# Patient Record
Sex: MALE | Race: WHITE | NOT HISPANIC OR LATINO | URBAN - METROPOLITAN AREA
[De-identification: names, ages, dates, MRNs, and addresses within clinical notes are randomized per-mention and may not be internally consistent; named-entity substitution may affect disease eponyms.]

---

## 2019-04-30 ENCOUNTER — OUTPATIENT (OUTPATIENT)
Dept: OUTPATIENT SERVICES | Facility: HOSPITAL | Age: 64
LOS: 1 days | End: 2019-04-30
Payer: COMMERCIAL

## 2019-04-30 VITALS
TEMPERATURE: 98 F | OXYGEN SATURATION: 98 % | RESPIRATION RATE: 16 BRPM | DIASTOLIC BLOOD PRESSURE: 86 MMHG | SYSTOLIC BLOOD PRESSURE: 140 MMHG | HEART RATE: 51 BPM | WEIGHT: 214.07 LBS | HEIGHT: 70 IN

## 2019-04-30 DIAGNOSIS — K43.6 OTHER AND UNSPECIFIED VENTRAL HERNIA WITH OBSTRUCTION, WITHOUT GANGRENE: ICD-10-CM

## 2019-04-30 DIAGNOSIS — Z98.890 OTHER SPECIFIED POSTPROCEDURAL STATES: ICD-10-CM

## 2019-04-30 DIAGNOSIS — K43.9 VENTRAL HERNIA WITHOUT OBSTRUCTION OR GANGRENE: ICD-10-CM

## 2019-04-30 DIAGNOSIS — Z98.890 OTHER SPECIFIED POSTPROCEDURAL STATES: Chronic | ICD-10-CM

## 2019-04-30 LAB
ANION GAP SERPL CALC-SCNC: 14 MMO/L — SIGNIFICANT CHANGE UP (ref 7–14)
BUN SERPL-MCNC: 23 MG/DL — SIGNIFICANT CHANGE UP (ref 7–23)
CALCIUM SERPL-MCNC: 10.1 MG/DL — SIGNIFICANT CHANGE UP (ref 8.4–10.5)
CHLORIDE SERPL-SCNC: 100 MMOL/L — SIGNIFICANT CHANGE UP (ref 98–107)
CO2 SERPL-SCNC: 28 MMOL/L — SIGNIFICANT CHANGE UP (ref 22–31)
CREAT SERPL-MCNC: 1.26 MG/DL — SIGNIFICANT CHANGE UP (ref 0.5–1.3)
GLUCOSE SERPL-MCNC: 114 MG/DL — HIGH (ref 70–99)
HCT VFR BLD CALC: 42.9 % — SIGNIFICANT CHANGE UP (ref 39–50)
HGB BLD-MCNC: 14.7 G/DL — SIGNIFICANT CHANGE UP (ref 13–17)
MCHC RBC-ENTMCNC: 29.2 PG — SIGNIFICANT CHANGE UP (ref 27–34)
MCHC RBC-ENTMCNC: 34.3 % — SIGNIFICANT CHANGE UP (ref 32–36)
MCV RBC AUTO: 85.1 FL — SIGNIFICANT CHANGE UP (ref 80–100)
NRBC # FLD: 0 K/UL — SIGNIFICANT CHANGE UP (ref 0–0)
PLATELET # BLD AUTO: 267 K/UL — SIGNIFICANT CHANGE UP (ref 150–400)
PMV BLD: 9.8 FL — SIGNIFICANT CHANGE UP (ref 7–13)
POTASSIUM SERPL-MCNC: 4.5 MMOL/L — SIGNIFICANT CHANGE UP (ref 3.5–5.3)
POTASSIUM SERPL-SCNC: 4.5 MMOL/L — SIGNIFICANT CHANGE UP (ref 3.5–5.3)
RBC # BLD: 5.04 M/UL — SIGNIFICANT CHANGE UP (ref 4.2–5.8)
RBC # FLD: 13.3 % — SIGNIFICANT CHANGE UP (ref 10.3–14.5)
SODIUM SERPL-SCNC: 142 MMOL/L — SIGNIFICANT CHANGE UP (ref 135–145)
WBC # BLD: 7.74 K/UL — SIGNIFICANT CHANGE UP (ref 3.8–10.5)
WBC # FLD AUTO: 7.74 K/UL — SIGNIFICANT CHANGE UP (ref 3.8–10.5)

## 2019-04-30 PROCEDURE — 93010 ELECTROCARDIOGRAM REPORT: CPT

## 2019-04-30 NOTE — H&P PST ADULT - HISTORY OF PRESENT ILLNESS
64 y/o male with hx of ventral hernia x2 years, which has  been enlarging over time.  Pt denies abdominal pain.  Scheduled for Ventral hernia repair 5/13/19.

## 2019-04-30 NOTE — H&P PST ADULT - NSICDXPROBLEM_GEN_ALL_CORE_FT
PROBLEM DIAGNOSES  Problem: Ventral hernia  Assessment and Plan: Ventral hernia reapir 5/13/19  CBC BMP EKG  pre-op instructions given and explained  DAI precautions, OR booking informed  Pt reports he has appt to see PMD for pre-op eval  5/8/19 as requested by surgeon, requested on chart

## 2019-04-30 NOTE — H&P PST ADULT - ASSESSMENT
62 y/o male with hx of ventral hernia x2 years, which has  been enlarging over time.  Pt denies abdominal pain.  Scheduled for Ventral hernia repair 5/13/19.

## 2019-04-30 NOTE — H&P PST ADULT - NSANTHOSAYNRD_GEN_A_CORE
No. DAI screening performed.  STOP BANG Legend: 0-2 = LOW Risk; 3-4 = INTERMEDIATE Risk; 5-8 = HIGH Risk

## 2019-04-30 NOTE — H&P PST ADULT - GASTROINTESTINAL COMMENTS
hx of ventral hernia x2 years, which has  been enlarging over time.  Pt denies abdominal pain.  Scheduled for Ventral hernia repair 5/13/19.

## 2019-05-13 ENCOUNTER — INPATIENT (INPATIENT)
Facility: HOSPITAL | Age: 64
LOS: 9 days | Discharge: HOME | End: 2019-05-23
Attending: SURGERY | Admitting: SURGERY
Payer: COMMERCIAL

## 2019-05-13 VITALS
DIASTOLIC BLOOD PRESSURE: 75 MMHG | TEMPERATURE: 98 F | SYSTOLIC BLOOD PRESSURE: 149 MMHG | HEART RATE: 53 BPM | RESPIRATION RATE: 16 BRPM | HEIGHT: 70 IN | OXYGEN SATURATION: 96 % | WEIGHT: 214.07 LBS

## 2019-05-13 DIAGNOSIS — Z98.890 OTHER SPECIFIED POSTPROCEDURAL STATES: Chronic | ICD-10-CM

## 2019-05-13 DIAGNOSIS — K43.6 OTHER AND UNSPECIFIED VENTRAL HERNIA WITH OBSTRUCTION, WITHOUT GANGRENE: ICD-10-CM

## 2019-05-13 LAB
ANION GAP SERPL CALC-SCNC: 12 MMO/L — SIGNIFICANT CHANGE UP (ref 7–14)
APTT BLD: 33.8 SEC — SIGNIFICANT CHANGE UP (ref 27.5–36.3)
BASOPHILS # BLD AUTO: 0.04 K/UL — SIGNIFICANT CHANGE UP (ref 0–0.2)
BASOPHILS NFR BLD AUTO: 0.3 % — SIGNIFICANT CHANGE UP (ref 0–2)
BUN SERPL-MCNC: 20 MG/DL — SIGNIFICANT CHANGE UP (ref 7–23)
CALCIUM SERPL-MCNC: 8.8 MG/DL — SIGNIFICANT CHANGE UP (ref 8.4–10.5)
CHLORIDE SERPL-SCNC: 104 MMOL/L — SIGNIFICANT CHANGE UP (ref 98–107)
CO2 SERPL-SCNC: 25 MMOL/L — SIGNIFICANT CHANGE UP (ref 22–31)
CREAT SERPL-MCNC: 1.13 MG/DL — SIGNIFICANT CHANGE UP (ref 0.5–1.3)
EOSINOPHIL # BLD AUTO: 0.03 K/UL — SIGNIFICANT CHANGE UP (ref 0–0.5)
EOSINOPHIL NFR BLD AUTO: 0.3 % — SIGNIFICANT CHANGE UP (ref 0–6)
GLUCOSE SERPL-MCNC: 157 MG/DL — HIGH (ref 70–99)
HCT VFR BLD CALC: 40 % — SIGNIFICANT CHANGE UP (ref 39–50)
HGB BLD-MCNC: 13.7 G/DL — SIGNIFICANT CHANGE UP (ref 13–17)
IMM GRANULOCYTES NFR BLD AUTO: 0.3 % — SIGNIFICANT CHANGE UP (ref 0–1.5)
INR BLD: 1.1 — SIGNIFICANT CHANGE UP (ref 0.88–1.17)
LYMPHOCYTES # BLD AUTO: 0.53 K/UL — LOW (ref 1–3.3)
LYMPHOCYTES # BLD AUTO: 4.6 % — LOW (ref 13–44)
MCHC RBC-ENTMCNC: 29.2 PG — SIGNIFICANT CHANGE UP (ref 27–34)
MCHC RBC-ENTMCNC: 34.3 % — SIGNIFICANT CHANGE UP (ref 32–36)
MCV RBC AUTO: 85.3 FL — SIGNIFICANT CHANGE UP (ref 80–100)
MONOCYTES # BLD AUTO: 0.39 K/UL — SIGNIFICANT CHANGE UP (ref 0–0.9)
MONOCYTES NFR BLD AUTO: 3.4 % — SIGNIFICANT CHANGE UP (ref 2–14)
NEUTROPHILS # BLD AUTO: 10.52 K/UL — HIGH (ref 1.8–7.4)
NEUTROPHILS NFR BLD AUTO: 91.1 % — HIGH (ref 43–77)
NRBC # FLD: 0 K/UL — SIGNIFICANT CHANGE UP (ref 0–0)
PLATELET # BLD AUTO: 258 K/UL — SIGNIFICANT CHANGE UP (ref 150–400)
PMV BLD: 9.4 FL — SIGNIFICANT CHANGE UP (ref 7–13)
POTASSIUM SERPL-MCNC: 3.9 MMOL/L — SIGNIFICANT CHANGE UP (ref 3.5–5.3)
POTASSIUM SERPL-SCNC: 3.9 MMOL/L — SIGNIFICANT CHANGE UP (ref 3.5–5.3)
PROTHROM AB SERPL-ACNC: 12.2 SEC — SIGNIFICANT CHANGE UP (ref 9.8–13.1)
RBC # BLD: 4.69 M/UL — SIGNIFICANT CHANGE UP (ref 4.2–5.8)
RBC # FLD: 13.4 % — SIGNIFICANT CHANGE UP (ref 10.3–14.5)
SODIUM SERPL-SCNC: 141 MMOL/L — SIGNIFICANT CHANGE UP (ref 135–145)
WBC # BLD: 11.54 K/UL — HIGH (ref 3.8–10.5)
WBC # FLD AUTO: 11.54 K/UL — HIGH (ref 3.8–10.5)

## 2019-05-13 PROCEDURE — 88302 TISSUE EXAM BY PATHOLOGIST: CPT | Mod: 26

## 2019-05-13 PROCEDURE — 88305 TISSUE EXAM BY PATHOLOGIST: CPT | Mod: 26

## 2019-05-13 PROCEDURE — 88307 TISSUE EXAM BY PATHOLOGIST: CPT | Mod: 26

## 2019-05-13 PROCEDURE — 88304 TISSUE EXAM BY PATHOLOGIST: CPT | Mod: 26

## 2019-05-13 RX ORDER — OLMESARTAN MEDOXOMIL 5 MG/1
1 TABLET, FILM COATED ORAL
Qty: 0 | Refills: 0 | DISCHARGE

## 2019-05-13 RX ORDER — OXYCODONE HYDROCHLORIDE 5 MG/1
5 TABLET ORAL ONCE
Refills: 0 | Status: DISCONTINUED | OUTPATIENT
Start: 2019-05-13 | End: 2019-05-13

## 2019-05-13 RX ORDER — ACETAMINOPHEN 500 MG
1000 TABLET ORAL ONCE
Refills: 0 | Status: COMPLETED | OUTPATIENT
Start: 2019-05-13 | End: 2019-05-13

## 2019-05-13 RX ORDER — ACETAMINOPHEN 500 MG
1000 TABLET ORAL ONCE
Refills: 0 | Status: COMPLETED | OUTPATIENT
Start: 2019-05-13 | End: 2019-05-14

## 2019-05-13 RX ORDER — METOPROLOL TARTRATE 50 MG
1 TABLET ORAL
Qty: 0 | Refills: 0 | DISCHARGE

## 2019-05-13 RX ORDER — CEFOTETAN DISODIUM 1 G
2 VIAL (EA) INJECTION EVERY 12 HOURS
Refills: 0 | Status: DISCONTINUED | OUTPATIENT
Start: 2019-05-13 | End: 2019-05-14

## 2019-05-13 RX ORDER — FENTANYL CITRATE 50 UG/ML
25 INJECTION INTRAVENOUS
Refills: 0 | Status: DISCONTINUED | OUTPATIENT
Start: 2019-05-13 | End: 2019-05-13

## 2019-05-13 RX ORDER — NIFEDIPINE 30 MG
1 TABLET, EXTENDED RELEASE 24 HR ORAL
Qty: 0 | Refills: 0 | DISCHARGE

## 2019-05-13 RX ORDER — HYDROMORPHONE HYDROCHLORIDE 2 MG/ML
30 INJECTION INTRAMUSCULAR; INTRAVENOUS; SUBCUTANEOUS
Refills: 0 | Status: DISCONTINUED | OUTPATIENT
Start: 2019-05-13 | End: 2019-05-17

## 2019-05-13 RX ORDER — ONDANSETRON 8 MG/1
4 TABLET, FILM COATED ORAL ONCE
Refills: 0 | Status: DISCONTINUED | OUTPATIENT
Start: 2019-05-13 | End: 2019-05-17

## 2019-05-13 RX ORDER — SODIUM CHLORIDE 9 MG/ML
1000 INJECTION, SOLUTION INTRAVENOUS
Refills: 0 | Status: DISCONTINUED | OUTPATIENT
Start: 2019-05-13 | End: 2019-05-14

## 2019-05-13 RX ORDER — VITAMIN E 100 UNIT
1 CAPSULE ORAL
Qty: 0 | Refills: 0 | DISCHARGE

## 2019-05-13 RX ORDER — SODIUM CHLORIDE 9 MG/ML
1000 INJECTION, SOLUTION INTRAVENOUS
Refills: 0 | Status: DISCONTINUED | OUTPATIENT
Start: 2019-05-13 | End: 2019-05-13

## 2019-05-13 RX ORDER — ENOXAPARIN SODIUM 100 MG/ML
40 INJECTION SUBCUTANEOUS DAILY
Refills: 0 | Status: DISCONTINUED | OUTPATIENT
Start: 2019-05-13 | End: 2019-05-14

## 2019-05-13 RX ORDER — HYDROMORPHONE HYDROCHLORIDE 2 MG/ML
0.5 INJECTION INTRAMUSCULAR; INTRAVENOUS; SUBCUTANEOUS
Refills: 0 | Status: DISCONTINUED | OUTPATIENT
Start: 2019-05-13 | End: 2019-05-17

## 2019-05-13 RX ORDER — FOLIC ACID 0.8 MG
1 TABLET ORAL
Qty: 0 | Refills: 0 | DISCHARGE

## 2019-05-13 RX ORDER — ROSUVASTATIN CALCIUM 5 MG/1
1 TABLET ORAL
Qty: 0 | Refills: 0 | DISCHARGE

## 2019-05-13 RX ORDER — ONDANSETRON 8 MG/1
4 TABLET, FILM COATED ORAL EVERY 6 HOURS
Refills: 0 | Status: DISCONTINUED | OUTPATIENT
Start: 2019-05-13 | End: 2019-05-17

## 2019-05-13 RX ORDER — ICOSAPENT ETHYL 500 MG/1
1 CAPSULE, LIQUID FILLED ORAL
Qty: 0 | Refills: 0 | DISCHARGE

## 2019-05-13 RX ORDER — SPIRONOLACTONE 25 MG/1
1 TABLET, FILM COATED ORAL
Qty: 0 | Refills: 0 | DISCHARGE

## 2019-05-13 RX ORDER — METOPROLOL TARTRATE 50 MG
5 TABLET ORAL EVERY 6 HOURS
Refills: 0 | Status: DISCONTINUED | OUTPATIENT
Start: 2019-05-13 | End: 2019-05-14

## 2019-05-13 RX ORDER — NALOXONE HYDROCHLORIDE 4 MG/.1ML
0.1 SPRAY NASAL
Refills: 0 | Status: DISCONTINUED | OUTPATIENT
Start: 2019-05-13 | End: 2019-05-17

## 2019-05-13 RX ORDER — ACETAMINOPHEN 500 MG
1000 TABLET ORAL ONCE
Refills: 0 | Status: COMPLETED | OUTPATIENT
Start: 2019-05-14 | End: 2019-05-14

## 2019-05-13 RX ORDER — HYDROMORPHONE HYDROCHLORIDE 2 MG/ML
0.5 INJECTION INTRAMUSCULAR; INTRAVENOUS; SUBCUTANEOUS
Refills: 0 | Status: DISCONTINUED | OUTPATIENT
Start: 2019-05-13 | End: 2019-05-13

## 2019-05-13 RX ADMIN — ENOXAPARIN SODIUM 40 MILLIGRAM(S): 100 INJECTION SUBCUTANEOUS at 13:05

## 2019-05-13 RX ADMIN — SODIUM CHLORIDE 125 MILLILITER(S): 9 INJECTION, SOLUTION INTRAVENOUS at 15:11

## 2019-05-13 RX ADMIN — HYDROMORPHONE HYDROCHLORIDE 30 MILLILITER(S): 2 INJECTION INTRAMUSCULAR; INTRAVENOUS; SUBCUTANEOUS at 15:10

## 2019-05-13 RX ADMIN — HYDROMORPHONE HYDROCHLORIDE 30 MILLILITER(S): 2 INJECTION INTRAMUSCULAR; INTRAVENOUS; SUBCUTANEOUS at 11:45

## 2019-05-13 RX ADMIN — Medication 100 GRAM(S): at 19:00

## 2019-05-13 RX ADMIN — Medication 400 MILLIGRAM(S): at 18:06

## 2019-05-13 RX ADMIN — HYDROMORPHONE HYDROCHLORIDE 30 MILLILITER(S): 2 INJECTION INTRAMUSCULAR; INTRAVENOUS; SUBCUTANEOUS at 20:38

## 2019-05-13 RX ADMIN — Medication 1000 MILLIGRAM(S): at 18:45

## 2019-05-13 RX ADMIN — Medication 5 MILLIGRAM(S): at 13:05

## 2019-05-13 NOTE — ASU PREOP CHECKLIST - 2.
patient is going for remove abdoman hematoma tosday. NPO since midnight. Abdoman dressing in place with abdoman binder in place, paulino 16 fr to bedside drainage.

## 2019-05-13 NOTE — BRIEF OPERATIVE NOTE - NSICDXBRIEFPOSTOP_GEN_ALL_CORE_FT
POST-OP DIAGNOSIS:  Incisional hernia 13-May-2019 11:08:11  Maame Liu  Enterocutaneous fistula 13-May-2019 11:07:59  Maame Liu

## 2019-05-13 NOTE — PROGRESS NOTE ADULT - SUBJECTIVE AND OBJECTIVE BOX
A Team Surgery Post Op Note     STATUS POST:  exlap, takedown of EC fistula, small bowel resection, incisional hernia repair with phasix mesh and removal of old mesh.     SUBJECTIVE: Pt seen s/p the above procedure. Patient resting comfortably in bed. Denies chest pain, palpitations, SOB, nausea. Complains only of mild incisional pain, but not severe enough he needs medications. No flatus or BM.     Vital Signs Last 24 Hrs  T(C): 37.1 (13 May 2019 14:36), Max: 37.1 (13 May 2019 14:36)  T(F): 98.7 (13 May 2019 14:36), Max: 98.7 (13 May 2019 14:36)  HR: 81 (13 May 2019 14:36) (53 - 81)  BP: 148/90 (13 May 2019 14:36) (130/81 - 149/75)  BP(mean): 102 (13 May 2019 13:00) (87 - 102)  RR: 18 (13 May 2019 14:36) (10 - 18)  SpO2: 96% (13 May 2019 14:36) (96% - 100%)  Blanco:  NGT:  I&O's Summary    13 May 2019 07:01  -  13 May 2019 17:13  --------------------------------------------------------  IN: 0 mL / OUT: 640 mL / NET: -640 mL      I&O's Detail    13 May 2019 07:01  -  13 May 2019 17:13  --------------------------------------------------------  IN:  Total IN: 0 mL    OUT:    Bulb: 30 mL    Indwelling Catheter - Urethral: 610 mL  Total OUT: 640 mL    Total NET: -640 mL          PHYSICAL EXAM:  Constitutional: NAD, pleasant, alert, oriented.   Respiratory:  Lungs CTA, B/L, no rales , no wheezing, no rhonchi.  Cardiovascular:  S1, S2, RRR  Gastrointestinal: Abdomen soft, non distended  - Midline dressing: c/d/i; DEREK drain: seosanguinou-- sanguinous > serous  Genitourinary:  Blanco in place draining clear urine.   Extremities:  No edema, no calf tenderrness,      LABS:                        13.7   11.54 )-----------( 258      ( 13 May 2019 13:00 )             40.0     05-13    141  |  104  |  20  ----------------------------<  157<H>  3.9   |  25  |  1.13    Ca    8.8      13 May 2019 13:01      PT/INR - ( 13 May 2019 12:08 )   PT: 12.2 SEC;   INR: 1.10          PTT - ( 13 May 2019 12:08 )  PTT:33.8 SEC

## 2019-05-14 LAB
ANION GAP SERPL CALC-SCNC: 12 MMO/L — SIGNIFICANT CHANGE UP (ref 7–14)
ANION GAP SERPL CALC-SCNC: 12 MMO/L — SIGNIFICANT CHANGE UP (ref 7–14)
APTT BLD: 28.9 SEC — SIGNIFICANT CHANGE UP (ref 27.5–36.3)
BLD GP AB SCN SERPL QL: NEGATIVE — SIGNIFICANT CHANGE UP
BUN SERPL-MCNC: 21 MG/DL — SIGNIFICANT CHANGE UP (ref 7–23)
BUN SERPL-MCNC: 26 MG/DL — HIGH (ref 7–23)
CALCIUM SERPL-MCNC: 8.2 MG/DL — LOW (ref 8.4–10.5)
CALCIUM SERPL-MCNC: 8.7 MG/DL — SIGNIFICANT CHANGE UP (ref 8.4–10.5)
CHLORIDE SERPL-SCNC: 100 MMOL/L — SIGNIFICANT CHANGE UP (ref 98–107)
CHLORIDE SERPL-SCNC: 101 MMOL/L — SIGNIFICANT CHANGE UP (ref 98–107)
CO2 SERPL-SCNC: 25 MMOL/L — SIGNIFICANT CHANGE UP (ref 22–31)
CO2 SERPL-SCNC: 26 MMOL/L — SIGNIFICANT CHANGE UP (ref 22–31)
CREAT SERPL-MCNC: 1.21 MG/DL — SIGNIFICANT CHANGE UP (ref 0.5–1.3)
CREAT SERPL-MCNC: 1.43 MG/DL — HIGH (ref 0.5–1.3)
GLUCOSE SERPL-MCNC: 152 MG/DL — HIGH (ref 70–99)
GLUCOSE SERPL-MCNC: 194 MG/DL — HIGH (ref 70–99)
HCT VFR BLD CALC: 25.7 % — LOW (ref 39–50)
HCT VFR BLD CALC: 31.7 % — LOW (ref 39–50)
HCT VFR BLD CALC: 34.9 % — LOW (ref 39–50)
HGB BLD-MCNC: 10.9 G/DL — LOW (ref 13–17)
HGB BLD-MCNC: 11.9 G/DL — LOW (ref 13–17)
HGB BLD-MCNC: 8.7 G/DL — LOW (ref 13–17)
INR BLD: 1.2 — HIGH (ref 0.88–1.17)
MAGNESIUM SERPL-MCNC: 1.7 MG/DL — SIGNIFICANT CHANGE UP (ref 1.6–2.6)
MAGNESIUM SERPL-MCNC: 2 MG/DL — SIGNIFICANT CHANGE UP (ref 1.6–2.6)
MCHC RBC-ENTMCNC: 28.8 PG — SIGNIFICANT CHANGE UP (ref 27–34)
MCHC RBC-ENTMCNC: 29.6 PG — SIGNIFICANT CHANGE UP (ref 27–34)
MCHC RBC-ENTMCNC: 29.6 PG — SIGNIFICANT CHANGE UP (ref 27–34)
MCHC RBC-ENTMCNC: 33.9 % — SIGNIFICANT CHANGE UP (ref 32–36)
MCHC RBC-ENTMCNC: 34.1 % — SIGNIFICANT CHANGE UP (ref 32–36)
MCHC RBC-ENTMCNC: 34.4 % — SIGNIFICANT CHANGE UP (ref 32–36)
MCV RBC AUTO: 84.5 FL — SIGNIFICANT CHANGE UP (ref 80–100)
MCV RBC AUTO: 86.1 FL — SIGNIFICANT CHANGE UP (ref 80–100)
MCV RBC AUTO: 87.4 FL — SIGNIFICANT CHANGE UP (ref 80–100)
NRBC # FLD: 0 K/UL — SIGNIFICANT CHANGE UP (ref 0–0)
PHOSPHATE SERPL-MCNC: 3.5 MG/DL — SIGNIFICANT CHANGE UP (ref 2.5–4.5)
PHOSPHATE SERPL-MCNC: 3.6 MG/DL — SIGNIFICANT CHANGE UP (ref 2.5–4.5)
PLATELET # BLD AUTO: 238 K/UL — SIGNIFICANT CHANGE UP (ref 150–400)
PLATELET # BLD AUTO: 257 K/UL — SIGNIFICANT CHANGE UP (ref 150–400)
PLATELET # BLD AUTO: 296 K/UL — SIGNIFICANT CHANGE UP (ref 150–400)
PMV BLD: 9.4 FL — SIGNIFICANT CHANGE UP (ref 7–13)
PMV BLD: 9.6 FL — SIGNIFICANT CHANGE UP (ref 7–13)
PMV BLD: 9.7 FL — SIGNIFICANT CHANGE UP (ref 7–13)
POTASSIUM SERPL-MCNC: 4.1 MMOL/L — SIGNIFICANT CHANGE UP (ref 3.5–5.3)
POTASSIUM SERPL-MCNC: 4.3 MMOL/L — SIGNIFICANT CHANGE UP (ref 3.5–5.3)
POTASSIUM SERPL-SCNC: 4.1 MMOL/L — SIGNIFICANT CHANGE UP (ref 3.5–5.3)
POTASSIUM SERPL-SCNC: 4.3 MMOL/L — SIGNIFICANT CHANGE UP (ref 3.5–5.3)
PROTHROM AB SERPL-ACNC: 13.7 SEC — HIGH (ref 9.8–13.1)
RBC # BLD: 2.94 M/UL — LOW (ref 4.2–5.8)
RBC # BLD: 3.68 M/UL — LOW (ref 4.2–5.8)
RBC # BLD: 4.13 M/UL — LOW (ref 4.2–5.8)
RBC # FLD: 13.2 % — SIGNIFICANT CHANGE UP (ref 10.3–14.5)
RBC # FLD: 13.4 % — SIGNIFICANT CHANGE UP (ref 10.3–14.5)
RBC # FLD: 13.6 % — SIGNIFICANT CHANGE UP (ref 10.3–14.5)
RH IG SCN BLD-IMP: POSITIVE — SIGNIFICANT CHANGE UP
RH IG SCN BLD-IMP: POSITIVE — SIGNIFICANT CHANGE UP
SODIUM SERPL-SCNC: 138 MMOL/L — SIGNIFICANT CHANGE UP (ref 135–145)
SODIUM SERPL-SCNC: 138 MMOL/L — SIGNIFICANT CHANGE UP (ref 135–145)
WBC # BLD: 10.26 K/UL — SIGNIFICANT CHANGE UP (ref 3.8–10.5)
WBC # BLD: 12.49 K/UL — HIGH (ref 3.8–10.5)
WBC # BLD: 8.57 K/UL — SIGNIFICANT CHANGE UP (ref 3.8–10.5)
WBC # FLD AUTO: 10.26 K/UL — SIGNIFICANT CHANGE UP (ref 3.8–10.5)
WBC # FLD AUTO: 12.49 K/UL — HIGH (ref 3.8–10.5)
WBC # FLD AUTO: 8.57 K/UL — SIGNIFICANT CHANGE UP (ref 3.8–10.5)

## 2019-05-14 RX ORDER — HYDROMORPHONE HYDROCHLORIDE 2 MG/ML
0.5 INJECTION INTRAMUSCULAR; INTRAVENOUS; SUBCUTANEOUS
Refills: 0 | Status: DISCONTINUED | OUTPATIENT
Start: 2019-05-14 | End: 2019-05-14

## 2019-05-14 RX ORDER — PANTOPRAZOLE SODIUM 20 MG/1
40 TABLET, DELAYED RELEASE ORAL DAILY
Refills: 0 | Status: DISCONTINUED | OUTPATIENT
Start: 2019-05-14 | End: 2019-05-23

## 2019-05-14 RX ORDER — ACETAMINOPHEN 500 MG
1000 TABLET ORAL ONCE
Refills: 0 | Status: COMPLETED | OUTPATIENT
Start: 2019-05-15 | End: 2019-05-15

## 2019-05-14 RX ORDER — SODIUM CHLORIDE 9 MG/ML
1000 INJECTION, SOLUTION INTRAVENOUS
Refills: 0 | Status: DISCONTINUED | OUTPATIENT
Start: 2019-05-14 | End: 2019-05-14

## 2019-05-14 RX ORDER — METOPROLOL TARTRATE 50 MG
5 TABLET ORAL EVERY 6 HOURS
Refills: 0 | Status: DISCONTINUED | OUTPATIENT
Start: 2019-05-14 | End: 2019-05-14

## 2019-05-14 RX ORDER — ACETAMINOPHEN 500 MG
1000 TABLET ORAL ONCE
Refills: 0 | Status: COMPLETED | OUTPATIENT
Start: 2019-05-14 | End: 2019-05-14

## 2019-05-14 RX ORDER — METOPROLOL TARTRATE 50 MG
5 TABLET ORAL EVERY 6 HOURS
Refills: 0 | Status: DISCONTINUED | OUTPATIENT
Start: 2019-05-14 | End: 2019-05-15

## 2019-05-14 RX ORDER — PIPERACILLIN AND TAZOBACTAM 4; .5 G/20ML; G/20ML
3.38 INJECTION, POWDER, LYOPHILIZED, FOR SOLUTION INTRAVENOUS EVERY 8 HOURS
Refills: 0 | Status: DISCONTINUED | OUTPATIENT
Start: 2019-05-14 | End: 2019-05-20

## 2019-05-14 RX ORDER — FAMOTIDINE 10 MG/ML
20 INJECTION INTRAVENOUS ONCE
Refills: 0 | Status: COMPLETED | OUTPATIENT
Start: 2019-05-14 | End: 2019-05-14

## 2019-05-14 RX ORDER — ONDANSETRON 8 MG/1
4 TABLET, FILM COATED ORAL ONCE
Refills: 0 | Status: DISCONTINUED | OUTPATIENT
Start: 2019-05-14 | End: 2019-05-14

## 2019-05-14 RX ORDER — MAGNESIUM SULFATE 500 MG/ML
2 VIAL (ML) INJECTION ONCE
Refills: 0 | Status: COMPLETED | OUTPATIENT
Start: 2019-05-14 | End: 2019-05-14

## 2019-05-14 RX ORDER — SODIUM CHLORIDE 9 MG/ML
1000 INJECTION, SOLUTION INTRAVENOUS
Refills: 0 | Status: DISCONTINUED | OUTPATIENT
Start: 2019-05-14 | End: 2019-05-17

## 2019-05-14 RX ADMIN — Medication 400 MILLIGRAM(S): at 13:42

## 2019-05-14 RX ADMIN — Medication 50 GRAM(S): at 11:41

## 2019-05-14 RX ADMIN — HYDROMORPHONE HYDROCHLORIDE 30 MILLILITER(S): 2 INJECTION INTRAMUSCULAR; INTRAVENOUS; SUBCUTANEOUS at 08:28

## 2019-05-14 RX ADMIN — FAMOTIDINE 20 MILLIGRAM(S): 10 INJECTION INTRAVENOUS at 20:13

## 2019-05-14 RX ADMIN — HYDROMORPHONE HYDROCHLORIDE 30 MILLILITER(S): 2 INJECTION INTRAMUSCULAR; INTRAVENOUS; SUBCUTANEOUS at 21:40

## 2019-05-14 RX ADMIN — PIPERACILLIN AND TAZOBACTAM 25 GRAM(S): 4; .5 INJECTION, POWDER, LYOPHILIZED, FOR SOLUTION INTRAVENOUS at 10:20

## 2019-05-14 RX ADMIN — Medication 5 MILLIGRAM(S): at 12:41

## 2019-05-14 RX ADMIN — Medication 5 MILLIGRAM(S): at 01:05

## 2019-05-14 RX ADMIN — Medication 5 MILLIGRAM(S): at 05:44

## 2019-05-14 RX ADMIN — SODIUM CHLORIDE 125 MILLILITER(S): 9 INJECTION, SOLUTION INTRAVENOUS at 13:42

## 2019-05-14 RX ADMIN — SODIUM CHLORIDE 125 MILLILITER(S): 9 INJECTION, SOLUTION INTRAVENOUS at 19:05

## 2019-05-14 RX ADMIN — Medication 400 MILLIGRAM(S): at 07:51

## 2019-05-14 RX ADMIN — Medication 400 MILLIGRAM(S): at 00:53

## 2019-05-14 RX ADMIN — Medication 400 MILLIGRAM(S): at 20:33

## 2019-05-14 RX ADMIN — Medication 1000 MILLIGRAM(S): at 20:45

## 2019-05-14 RX ADMIN — Medication 1000 MILLIGRAM(S): at 01:23

## 2019-05-14 RX ADMIN — Medication 100 GRAM(S): at 05:49

## 2019-05-14 RX ADMIN — Medication 1000 MILLIGRAM(S): at 08:38

## 2019-05-14 RX ADMIN — Medication 1000 MILLIGRAM(S): at 14:12

## 2019-05-14 NOTE — PRE-OP CHECKLIST - 1.
abdoman wound with left Geronimo in place. Abdoman binder in place. Blanco 16 Fr to bed side draiange

## 2019-05-14 NOTE — PROGRESS NOTE ADULT - SUBJECTIVE AND OBJECTIVE BOX
[facility] DAILY PROGRESS NOTE    SUBJECTIVE:  -  c/o abd pain through PCA  -  dropped H/H overnight, monitoring for bleed    OBJECTIVE:    Vital Signs Last 24 Hrs  T(C): 36.9 (14 May 2019 10:05), Max: 37.1 (13 May 2019 14:36)  T(F): 98.4 (14 May 2019 10:05), Max: 98.7 (13 May 2019 14:36)  HR: 66 (14 May 2019 10:05) (49 - 84)  BP: 128/79 (14 May 2019 10:05) (128/79 - 152/88)  BP(mean): 102 (13 May 2019 13:00) (95 - 102)  RR: 17 (14 May 2019 10:05) (11 - 18)  SpO2: 94% (14 May 2019 10:05) (93% - 100%)    EXAM:  Gen:       alert, in NAD  Lungs:       unlabored breathing  CV:       regular rate, rhythm  Abd:       nondistended, appropriately tender over surgical site                 incisions clean, dry, intact                 paulino in place, set to gravity, functioning                NGT in place, set to LCWS, flushed and functioning                DEREK drain x 1 in LLQ with SS output                midline laparotomy with staples in place, c/d/i

## 2019-05-14 NOTE — PROGRESS NOTE ADULT - SUBJECTIVE AND OBJECTIVE BOX
Anesthesia Pain Management Service    SUBJECTIVE: Patient is doing well with IV PCA and no significant problems reported.  Patient says the IV Tylenol helps more than the IV PCA.    Pain Scale Score	At rest: _0/10__ 	With Activity: __8/10_ 	[X ] Refer to charted pain scores    THERAPY:    [ ] IV PCA Morphine		[ ] 5 mg/mL	[ ] 1 mg/mL  [X ] IV PCA Hydromorphone	[ ] 5 mg/mL	[X ] 1 mg/mL  [ ] IV PCA Fentanyl		[ ] 50 micrograms/mL    Demand dose __0.2_ lockout __6_ (minutes) Continuous Rate _0__ Total: __2.7_  mg used (in past 24 hours)      MEDICATIONS  (STANDING):  acetaminophen  IVPB .. 1000 milliGRAM(s) IV Intermittent once  acetaminophen  IVPB .. 1000 milliGRAM(s) IV Intermittent once  cefoTEtan  IVPB 2 Gram(s) IV Intermittent every 12 hours  enoxaparin Injectable 40 milliGRAM(s) SubCutaneous daily  HYDROmorphone PCA (1 mG/mL) 30 milliLiter(s) PCA Continuous PCA Continuous  lactated ringers. 1000 milliLiter(s) (125 mL/Hr) IV Continuous <Continuous>  magnesium sulfate  IVPB 2 Gram(s) IV Intermittent once  metoprolol tartrate Injectable 5 milliGRAM(s) IV Push every 6 hours    MEDICATIONS  (PRN):  HYDROmorphone PCA (1 mG/mL) Rescue Clinician Bolus 0.5 milliGRAM(s) IV Push every 15 minutes PRN for Pain Scale GREATER THAN 6  naloxone Injectable 0.1 milliGRAM(s) IV Push every 3 minutes PRN For ANY of the following changes in patient status:  A. RR LESS THAN 10 breaths per minute, B. Oxygen saturation LESS THAN 90%, C. Sedation score of 6  ondansetron Injectable 4 milliGRAM(s) IV Push every 6 hours PRN Nausea  ondansetron Injectable 4 milliGRAM(s) IV Push once PRN Nausea and/or Vomiting      OBJECTIVE:  Patient is NPO, lying in bed, comfortable.    Sedation Score:	[ X] Alert	[ ] Drowsy 	[ ] Arousable	[ ] Asleep	[ ] Unresponsive    Side Effects:	[X ] None	[ ] Nausea	[ ] Vomiting	[ ] Pruritus  		[ ] Other:    Vital Signs Last 24 Hrs  T(C): 36.9 (14 May 2019 05:44), Max: 37.1 (13 May 2019 14:36)  T(F): 98.5 (14 May 2019 05:44), Max: 98.7 (13 May 2019 14:36)  HR: 73 (14 May 2019 05:44) (49 - 84)  BP: 149/89 (14 May 2019 05:44) (130/81 - 152/88)  BP(mean): 102 (13 May 2019 13:00) (87 - 102)  RR: 18 (14 May 2019 05:44) (10 - 18)  SpO2: 94% (14 May 2019 05:44) (93% - 100%)    ASSESSMENT/ PLAN    Therapy to  be:	[ X] Continue   [ ] Discontinued   [ ] Change to prn Analgesics    Documentation and Verification of current medications:   [X] Done	[ ] Not done, not elligible    Comments:  Continue current pain regimen, IV Tylenol added by team.

## 2019-05-14 NOTE — BRIEF OPERATIVE NOTE - NSICDXBRIEFPROCEDURE_GEN_ALL_CORE_FT
PROCEDURES:  Drainage, hematoma 14-May-2019 19:03:28  Bryce Viveros
PROCEDURES:  Reconstruction of anterior abdominal wall using prosthetic material 13-May-2019 11:07:20  Maame Liu  Incisional hernia repair with mesh 13-May-2019 11:06:51  Maame Liu  Small bowel resection with anastomosis 13-May-2019 11:06:18  Maame Liu  Exploratory celiotomy 13-May-2019 11:05:22  aMame Liu

## 2019-05-14 NOTE — BRIEF OPERATIVE NOTE - OPERATION/FINDINGS
Reopening of midline incision revealed 500cc blood and clot, evacuated. Mesh explanted. Bleeding perforating vessel found and ligated.   New mesh implanted. Wound copiously irrigated.  Closed with 0 vicryl and staples.  19Fr micky in subQ.
celiotomy, takedown of enterocutaneous fistula, small bowel resection with anatomical side to side, functional end to end anastomosis, incisional hernia repair with Phasix mesh (onlay), removal of foreign body (previous mesh)

## 2019-05-14 NOTE — PROGRESS NOTE ADULT - SUBJECTIVE AND OBJECTIVE BOX
Day _2__ of Anesthesia Pain Management Service    SUBJECTIVE:    Therapy:	  [ x] IV PCA	   [ ] Epidural           [ ] s/p Spinal Opoid              [ ] Postpartum infusion	  [ ] Patient controlled regional anesthesia (PCRA)    [ ] prn Analgesics    OBJECTIVE:   [x ] No new signs     [ ] Other:    Side Effects:  [ x] None			[ ] Other:    Assessment of Catheter Site:		[ ] Intact		[ ] Other:    ASSESSMENT/PLAN  [x ] Continue current therapy    [ ] Therapy changed to:    [ ] IV PCA       [ ] Epidural     [ ] prn Analgesics     Comments:

## 2019-05-14 NOTE — PROGRESS NOTE ADULT - SUBJECTIVE AND OBJECTIVE BOX
INTERVAL HPI/OVERNIGHT EVENTS: Pt feels well. Significantly improved incisional pain. No bowel function    STATUS POST:  laparotomy, SBR, repair of incisional hernia with Phasix    POST OPERATIVE DAY #: 1    MEDICATIONS  (STANDING):  acetaminophen  IVPB .. 1000 milliGRAM(s) IV Intermittent once  acetaminophen  IVPB .. 1000 milliGRAM(s) IV Intermittent once  enoxaparin Injectable 40 milliGRAM(s) SubCutaneous daily  HYDROmorphone PCA (1 mG/mL) 30 milliLiter(s) PCA Continuous PCA Continuous  lactated ringers. 1000 milliLiter(s) (125 mL/Hr) IV Continuous <Continuous>  magnesium sulfate  IVPB 2 Gram(s) IV Intermittent once  metoprolol tartrate Injectable 5 milliGRAM(s) IV Push every 6 hours  piperacillin/tazobactam IVPB. 3.375 Gram(s) IV Intermittent every 8 hours    MEDICATIONS  (PRN):  HYDROmorphone PCA (1 mG/mL) Rescue Clinician Bolus 0.5 milliGRAM(s) IV Push every 15 minutes PRN for Pain Scale GREATER THAN 6  naloxone Injectable 0.1 milliGRAM(s) IV Push every 3 minutes PRN For ANY of the following changes in patient status:  A. RR LESS THAN 10 breaths per minute, B. Oxygen saturation LESS THAN 90%, C. Sedation score of 6  ondansetron Injectable 4 milliGRAM(s) IV Push every 6 hours PRN Nausea  ondansetron Injectable 4 milliGRAM(s) IV Push once PRN Nausea and/or Vomiting      Vital Signs Last 24 Hrs  T(C): 36.9 (14 May 2019 10:05), Max: 37.1 (13 May 2019 14:36)  T(F): 98.4 (14 May 2019 10:05), Max: 98.7 (13 May 2019 14:36)  HR: 66 (14 May 2019 10:05) (49 - 84)  BP: 128/79 (14 May 2019 10:05) (128/79 - 152/88)  BP(mean): 102 (13 May 2019 13:00) (87 - 102)  RR: 17 (14 May 2019 10:05) (10 - 18)  SpO2: 94% (14 May 2019 10:05) (93% - 100%)  I&O's Detail    13 May 2019 07:01  -  14 May 2019 07:00  --------------------------------------------------------  IN:    IV PiggyBack: 150 mL    lactated ringers.: 1500 mL  Total IN: 1650 mL    OUT:    Bulb: 130 mL    Indwelling Catheter - Urethral: 1860 mL  Total OUT: 1990 mL    Total NET: -340 mL      14 May 2019 07:01  -  14 May 2019 10:14  --------------------------------------------------------  IN:  Total IN: 0 mL    OUT:    Indwelling Catheter - Urethral: 200 mL  Total OUT: 200 mL    Total NET: -200 mL      Abdominal: Soft, mildly tender to мария-incisional palpation, slightly distended, moderate incisional/hematoma. Drain bloody output        LABS:                        11.9   8.57  )-----------( 257      ( 14 May 2019 05:35 )             34.9     05-14    138  |  101  |  21  ----------------------------<  152<H>  4.1   |  25  |  1.21    Ca    8.7      14 May 2019 05:35  Phos  3.6     05-14  Mg     1.7     05-14      PT/INR - ( 13 May 2019 12:08 )   PT: 12.2 SEC;   INR: 1.10          PTT - ( 13 May 2019 12:08 )  PTT:33.8 SEC      RADIOLOGY & ADDITIONAL STUDIES:

## 2019-05-14 NOTE — PROGRESS NOTE ADULT - SUBJECTIVE AND OBJECTIVE BOX
ANESTHESIA POSTOP CHECK    63y Male POSTOP DAY 1 S/P   [x ] General Anesthesia  [ ] Ebnson Anesthesia  [ ] MAC    Vital Signs Last 24 Hrs  T(C): 36.9 (14 May 2019 10:05), Max: 37.1 (13 May 2019 14:36)  T(F): 98.4 (14 May 2019 10:05), Max: 98.7 (13 May 2019 14:36)  HR: 66 (14 May 2019 10:05) (49 - 84)  BP: 128/79 (14 May 2019 10:05) (128/79 - 152/88)  BP(mean): 102 (13 May 2019 13:00) (87 - 102)  RR: 17 (14 May 2019 10:05) (10 - 18)  SpO2: 94% (14 May 2019 10:05) (93% - 100%)  I&O's Summary    13 May 2019 07:01  -  14 May 2019 07:00  --------------------------------------------------------  IN: 1650 mL / OUT: 1990 mL / NET: -340 mL    14 May 2019 07:01  -  14 May 2019 10:39  --------------------------------------------------------  IN: 0 mL / OUT: 200 mL / NET: -200 mL        [x ] NO APPARENT ANESTHESIA COMPLICATIONS      Comments:

## 2019-05-14 NOTE — PRE-OP CHECKLIST - WARM FLUIDS/WARM BLANKETS
Health Maintenance Summary     Topic Due On Due Status Completed On    PAP SMEAR - CERVICAL CANCER SCREENING Oct 31, 2019 Not Due Oct 31, 2016    Immunization - TDAP Pregnancy  Hidden     IMMUNIZATION - DTaP/Tdap/Td May 5, 2020 Not Due May 5, 2010    Immunization-Influenza Sep 1, 2018 Not Due Oct 16, 2017          Patient is up to date, no discussion needed .           no

## 2019-05-14 NOTE — CHART NOTE - NSCHARTNOTEFT_GEN_A_CORE
Post-operative Check    SUBJECTIVE: No acute events in the immediate post-operative period. Pain well controlled. No nausea/vomiting.    OBJECTIVE:  T(C): 36.6 (05-14-19 @ 21:00), Max: 36.9 (05-14-19 @ 05:44)  HR: 97 (05-14-19 @ 22:15) (66 - 97)  BP: 126/81 (05-14-19 @ 22:00) (111/78 - 150/108)  RR: 18 (05-14-19 @ 22:15) (16 - 22)  SpO2: 97% (05-14-19 @ 22:15) (93% - 98%)      05-13-19 @ 07:01  -  05-14-19 @ 07:00  --------------------------------------------------------  IN: 1650 mL / OUT: 1990 mL / NET: -340 mL    05-14-19 @ 07:01  -  05-14-19 @ 23:05  --------------------------------------------------------  IN: 1850 mL / OUT: 1060 mL / NET: 790 mL        Physical Exam:   - Constitutional: AOx3, NAD  - CV: normotensive, regular rate   - Respiratory: nonlabored  - Abdomen: soft, nontender, nondistended  - Extremities: WWP  - Vascular: distal pusles 2+  - Neurological: no focal deficits    ASSESSMENT:   JOSE STERLING is a 63y Male s/p exploratory laparotomy/small bowel resection/incisional hernia repair with mesh/anterior component separation (4/13), c/b flap hematoma, now s/p RTOR for exploratory laparotomy/hematoma evacuation/explant and reimplant of mesh, stable postoperatively.     PLAN:  - Pain management - multimodal with PCA  - Follow UOP  - morning labs  - Diet: NPO with S&C  - IVF  - monitor GI function    A team surgery  [id number] Post-operative Check    SUBJECTIVE: No acute events in the immediate post-operative period. Pain well controlled. No nausea/vomiting.    OBJECTIVE:  T(C): 36.6 (05-14-19 @ 21:00), Max: 36.9 (05-14-19 @ 05:44)  HR: 97 (05-14-19 @ 22:15) (66 - 97)  BP: 126/81 (05-14-19 @ 22:00) (111/78 - 150/108)  RR: 18 (05-14-19 @ 22:15) (16 - 22)  SpO2: 97% (05-14-19 @ 22:15) (93% - 98%)      05-13-19 @ 07:01  -  05-14-19 @ 07:00  --------------------------------------------------------  IN: 1650 mL / OUT: 1990 mL / NET: -340 mL    05-14-19 @ 07:01  -  05-14-19 @ 23:05  --------------------------------------------------------  IN: 1850 mL / OUT: 1060 mL / NET: 790 mL        Physical Exam:   - Constitutional: AOx3, NAD  - CV: normotensive, regular rate   - Respiratory: nonlabored  - Abdomen: soft, nontender, mildly distended, abdominal bider in place, DEREK draining ss fluid  - Extremities: WWP  - Vascular: distal pusles 2+  - Neurological: no focal deficits    ASSESSMENT:   JOSE STERLING is a 63y Male s/p exploratory laparotomy/small bowel resection/incisional hernia repair with mesh/anterior component separation (4/13), c/b flap hematoma, now s/p RTOR for exploratory laparotomy/hematoma evacuation/explant and reimplant of mesh, stable postoperatively.     PLAN:  - Pain management - multimodal with PCA  - Follow UOP  - morning labs  - Diet: NPO with S&C  - IVF  - monitor GI function    A team surgery  [id number]

## 2019-05-15 LAB
ANION GAP SERPL CALC-SCNC: 8 MMO/L — SIGNIFICANT CHANGE UP (ref 7–14)
BUN SERPL-MCNC: 23 MG/DL — SIGNIFICANT CHANGE UP (ref 7–23)
CALCIUM SERPL-MCNC: 8.4 MG/DL — SIGNIFICANT CHANGE UP (ref 8.4–10.5)
CHLORIDE SERPL-SCNC: 102 MMOL/L — SIGNIFICANT CHANGE UP (ref 98–107)
CO2 SERPL-SCNC: 27 MMOL/L — SIGNIFICANT CHANGE UP (ref 22–31)
CREAT SERPL-MCNC: 1.32 MG/DL — HIGH (ref 0.5–1.3)
GLUCOSE SERPL-MCNC: 177 MG/DL — HIGH (ref 70–99)
HCT VFR BLD CALC: 23.9 % — LOW (ref 39–50)
HGB BLD-MCNC: 8.2 G/DL — LOW (ref 13–17)
MAGNESIUM SERPL-MCNC: 2.1 MG/DL — SIGNIFICANT CHANGE UP (ref 1.6–2.6)
MCHC RBC-ENTMCNC: 29.6 PG — SIGNIFICANT CHANGE UP (ref 27–34)
MCHC RBC-ENTMCNC: 34.3 % — SIGNIFICANT CHANGE UP (ref 32–36)
MCV RBC AUTO: 86.3 FL — SIGNIFICANT CHANGE UP (ref 80–100)
NRBC # FLD: 0 K/UL — SIGNIFICANT CHANGE UP (ref 0–0)
PHOSPHATE SERPL-MCNC: 3.6 MG/DL — SIGNIFICANT CHANGE UP (ref 2.5–4.5)
PLATELET # BLD AUTO: 235 K/UL — SIGNIFICANT CHANGE UP (ref 150–400)
PMV BLD: 9.7 FL — SIGNIFICANT CHANGE UP (ref 7–13)
POTASSIUM SERPL-MCNC: 4.2 MMOL/L — SIGNIFICANT CHANGE UP (ref 3.5–5.3)
POTASSIUM SERPL-SCNC: 4.2 MMOL/L — SIGNIFICANT CHANGE UP (ref 3.5–5.3)
RBC # BLD: 2.77 M/UL — LOW (ref 4.2–5.8)
RBC # FLD: 13.6 % — SIGNIFICANT CHANGE UP (ref 10.3–14.5)
SODIUM SERPL-SCNC: 137 MMOL/L — SIGNIFICANT CHANGE UP (ref 135–145)
WBC # BLD: 9.46 K/UL — SIGNIFICANT CHANGE UP (ref 3.8–10.5)
WBC # FLD AUTO: 9.46 K/UL — SIGNIFICANT CHANGE UP (ref 3.8–10.5)

## 2019-05-15 PROCEDURE — 74019 RADEX ABDOMEN 2 VIEWS: CPT | Mod: 26

## 2019-05-15 PROCEDURE — 71045 X-RAY EXAM CHEST 1 VIEW: CPT | Mod: 26

## 2019-05-15 RX ORDER — PANTOPRAZOLE SODIUM 20 MG/1
40 TABLET, DELAYED RELEASE ORAL ONCE
Refills: 0 | Status: COMPLETED | OUTPATIENT
Start: 2019-05-15 | End: 2019-05-15

## 2019-05-15 RX ORDER — SODIUM CHLORIDE 9 MG/ML
1000 INJECTION, SOLUTION INTRAVENOUS ONCE
Refills: 0 | Status: COMPLETED | OUTPATIENT
Start: 2019-05-15 | End: 2019-05-15

## 2019-05-15 RX ORDER — ACETAMINOPHEN 500 MG
1000 TABLET ORAL ONCE
Refills: 0 | Status: COMPLETED | OUTPATIENT
Start: 2019-05-15 | End: 2019-05-15

## 2019-05-15 RX ORDER — ENOXAPARIN SODIUM 100 MG/ML
40 INJECTION SUBCUTANEOUS DAILY
Refills: 0 | Status: DISCONTINUED | OUTPATIENT
Start: 2019-05-15 | End: 2019-05-18

## 2019-05-15 RX ADMIN — PIPERACILLIN AND TAZOBACTAM 25 GRAM(S): 4; .5 INJECTION, POWDER, LYOPHILIZED, FOR SOLUTION INTRAVENOUS at 09:34

## 2019-05-15 RX ADMIN — PIPERACILLIN AND TAZOBACTAM 25 GRAM(S): 4; .5 INJECTION, POWDER, LYOPHILIZED, FOR SOLUTION INTRAVENOUS at 17:24

## 2019-05-15 RX ADMIN — Medication 1000 MILLIGRAM(S): at 03:45

## 2019-05-15 RX ADMIN — Medication 400 MILLIGRAM(S): at 15:42

## 2019-05-15 RX ADMIN — ENOXAPARIN SODIUM 40 MILLIGRAM(S): 100 INJECTION SUBCUTANEOUS at 17:23

## 2019-05-15 RX ADMIN — SODIUM CHLORIDE 100 MILLILITER(S): 9 INJECTION, SOLUTION INTRAVENOUS at 18:27

## 2019-05-15 RX ADMIN — PANTOPRAZOLE SODIUM 40 MILLIGRAM(S): 20 TABLET, DELAYED RELEASE ORAL at 09:34

## 2019-05-15 RX ADMIN — PIPERACILLIN AND TAZOBACTAM 25 GRAM(S): 4; .5 INJECTION, POWDER, LYOPHILIZED, FOR SOLUTION INTRAVENOUS at 01:40

## 2019-05-15 RX ADMIN — Medication 400 MILLIGRAM(S): at 09:34

## 2019-05-15 RX ADMIN — HYDROMORPHONE HYDROCHLORIDE 30 MILLILITER(S): 2 INJECTION INTRAMUSCULAR; INTRAVENOUS; SUBCUTANEOUS at 08:25

## 2019-05-15 RX ADMIN — Medication 0.25 MILLIGRAM(S): at 22:00

## 2019-05-15 RX ADMIN — Medication 400 MILLIGRAM(S): at 03:15

## 2019-05-15 RX ADMIN — Medication 1000 MILLIGRAM(S): at 16:12

## 2019-05-15 RX ADMIN — SODIUM CHLORIDE 1000 MILLILITER(S): 9 INJECTION, SOLUTION INTRAVENOUS at 20:36

## 2019-05-15 RX ADMIN — Medication 1000 MILLIGRAM(S): at 10:04

## 2019-05-15 RX ADMIN — HYDROMORPHONE HYDROCHLORIDE 30 MILLILITER(S): 2 INJECTION INTRAMUSCULAR; INTRAVENOUS; SUBCUTANEOUS at 20:08

## 2019-05-15 RX ADMIN — HYDROMORPHONE HYDROCHLORIDE 30 MILLILITER(S): 2 INJECTION INTRAMUSCULAR; INTRAVENOUS; SUBCUTANEOUS at 00:23

## 2019-05-15 NOTE — PROGRESS NOTE ADULT - SUBJECTIVE AND OBJECTIVE BOX
ANESTHESIA POSTOP NOTE  63y Male POSTOP DAY 1    Vital Signs Last 24 Hrs  T(C): 37.2 (15 May 2019 14:58), Max: 37.2 (15 May 2019 14:58)  T(F): 98.9 (15 May 2019 14:58), Max: 98.9 (15 May 2019 14:58)  HR: 92 (15 May 2019 14:58) (74 - 100)  BP: 134/79 (15 May 2019 14:58) (106/66 - 143/85)  BP(mean): 95 (14 May 2019 22:00) (71 - 103)  RR: 18 (15 May 2019 14:58) (16 - 22)  SpO2: 96% (15 May 2019 14:58) (94% - 98%)  I&O's Summary    14 May 2019 07:01  -  15 May 2019 07:00  --------------------------------------------------------  IN: 3050 mL / OUT: 3095 mL / NET: -45 mL    15 May 2019 07:01  -  15 May 2019 15:32  --------------------------------------------------------  IN: 700 mL / OUT: 385 mL / NET: 315 mL        [ X ] NO APPARENT ANESTHESIA COMPLICATIONS      Comments:

## 2019-05-15 NOTE — PROGRESS NOTE ADULT - SUBJECTIVE AND OBJECTIVE BOX
INTERVAL HPI/OVERNIGHT EVENTS: Pt seen and examined. RTOR last night for evacuation of abd flap hematoma. Pt doing well. Pain controlled. Afebrile. VSS    POD#1: evacuation of abd wall flap hematoma  POD#2: ex-lap, SBR, repair of incisional hernia with Phasix    MEDICATIONS  (STANDING):  acetaminophen  IVPB .. 1000 milliGRAM(s) IV Intermittent once  enoxaparin Injectable 40 milliGRAM(s) SubCutaneous daily  HYDROmorphone PCA (1 mG/mL) 30 milliLiter(s) PCA Continuous PCA Continuous  lactated ringers. 1000 milliLiter(s) (125 mL/Hr) IV Continuous <Continuous>  pantoprazole  Injectable 40 milliGRAM(s) IV Push daily  piperacillin/tazobactam IVPB. 3.375 Gram(s) IV Intermittent every 8 hours    MEDICATIONS  (PRN):  HYDROmorphone PCA (1 mG/mL) Rescue Clinician Bolus 0.5 milliGRAM(s) IV Push every 15 minutes PRN for Pain Scale GREATER THAN 6  naloxone Injectable 0.1 milliGRAM(s) IV Push every 3 minutes PRN For ANY of the following changes in patient status:  A. RR LESS THAN 10 breaths per minute, B. Oxygen saturation LESS THAN 90%, C. Sedation score of 6  ondansetron Injectable 4 milliGRAM(s) IV Push every 6 hours PRN Nausea  ondansetron Injectable 4 milliGRAM(s) IV Push once PRN Nausea and/or Vomiting      Vital Signs Last 24 Hrs  T(C): 36.9 (15 May 2019 06:00), Max: 36.9 (14 May 2019 10:05)  T(F): 98.4 (15 May 2019 06:00), Max: 98.4 (14 May 2019 10:05)  HR: 90 (15 May 2019 06:00) (66 - 100)  BP: 140/77 (15 May 2019 07:40) (111/78 - 143/85)  BP(mean): 95 (14 May 2019 22:00) (71 - 103)  RR: 18 (15 May 2019 06:00) (16 - 22)  SpO2: 98% (15 May 2019 06:00) (93% - 98%)    PHYSICAL EXAM:      Constitutional: NAD    Gastrointestinal: Abd soft, ATTP, nondistended. Dressing c/d/i. Drain with SSF    Genitourinary: Blanco            I&O's Detail    14 May 2019 07:01  -  15 May 2019 07:00  --------------------------------------------------------  IN:    dextrose 5% + sodium chloride 0.45%.: 500 mL    IV PiggyBack: 550 mL    lactated ringers.: 500 mL    lactated ringers.: 1500 mL  Total IN: 3050 mL    OUT:    Bulb: 120 mL    Indwelling Catheter - Urethral: 1000 mL    Indwelling Catheter - Urethral: 1975 mL  Total OUT: 3095 mL    Total NET: -45 mL          LABS:                        8.2    9.46  )-----------( 235      ( 15 May 2019 05:30 )             23.9     05-15    137  |  102  |  23  ----------------------------<  177<H>  4.2   |  27  |  1.32<H>    Ca    8.4      15 May 2019 05:30  Phos  3.6     05-15  Mg     2.1     05-15      PT/INR - ( 14 May 2019 15:50 )   PT: 13.7 SEC;   INR: 1.20          PTT - ( 14 May 2019 15:50 )  PTT:28.9 SEC      RADIOLOGY & ADDITIONAL STUDIES:

## 2019-05-15 NOTE — PROGRESS NOTE ADULT - SUBJECTIVE AND OBJECTIVE BOX
Anesthesia Pain Management Service- Attending Addendum    SUBJECTIVE: Pt doing well with IV PCA without problems reported.    Therapy:	  [ X] IV PCA	   [ ] Epidural           [ ] s/p Spinal Opoid              [ ] Postpartum infusion	  [ ] Patient controlled regional anesthesia (PCRA)    [ ] prn Analgesics    Allergies    No Known Allergies    Intolerances      MEDICATIONS  (STANDING):  enoxaparin Injectable 40 milliGRAM(s) SubCutaneous daily  HYDROmorphone PCA (1 mG/mL) 30 milliLiter(s) PCA Continuous PCA Continuous  lactated ringers. 1000 milliLiter(s) (100 mL/Hr) IV Continuous <Continuous>  pantoprazole  Injectable 40 milliGRAM(s) IV Push daily  piperacillin/tazobactam IVPB. 3.375 Gram(s) IV Intermittent every 8 hours    MEDICATIONS  (PRN):  HYDROmorphone PCA (1 mG/mL) Rescue Clinician Bolus 0.5 milliGRAM(s) IV Push every 15 minutes PRN for Pain Scale GREATER THAN 6  naloxone Injectable 0.1 milliGRAM(s) IV Push every 3 minutes PRN For ANY of the following changes in patient status:  A. RR LESS THAN 10 breaths per minute, B. Oxygen saturation LESS THAN 90%, C. Sedation score of 6  ondansetron Injectable 4 milliGRAM(s) IV Push every 6 hours PRN Nausea  ondansetron Injectable 4 milliGRAM(s) IV Push once PRN Nausea and/or Vomiting      OBJECTIVE:   [X] No new signs     [ ] Other:    Side Effects:  [X ] None			[ ] Other:    Assessment of Catheter Site:		[ ] Intact		[ ] Other:    ASSESSMENT/PLAN  [ X] Continue current therapy    [ ] Therapy changed to:    [ ] IV PCA       [ ] Epidural     [ ] prn Analgesics     Comments:

## 2019-05-15 NOTE — CHART NOTE - NSCHARTNOTEFT_GEN_A_CORE
Pt c/o nausea and abdominal distention. No abdominal pain  Afebrile, VSS    Abd; soft, distended and tympanic in epigastrium, mildly tender to мария-incisional palpation. Wound c/d/i. Drain serosanguineous.    A/P- PO ileus    - NPO  - NGT placed allowing drainage of 500cc, bilious  - LR 1L bolus  - monitor U/O and VS  - AXR, CXR. Check NGT placement

## 2019-05-15 NOTE — PROGRESS NOTE ADULT - SUBJECTIVE AND OBJECTIVE BOX
[facility] DAILY PROGRESS NOTE    SUBJECTIVE:  -  went to OR 7pm last night for evacuation hematoma, mesh replacement  -  pain significantly improved today    OBJECTIVE:    Vital Signs Last 24 Hrs  T(C): 36.3 (15 May 2019 10:17), Max: 37 (15 May 2019 09:50)  T(F): 97.4 (15 May 2019 10:17), Max: 98.6 (15 May 2019 09:50)  HR: 74 (15 May 2019 10:17) (74 - 100)  BP: 106/66 (15 May 2019 10:17) (106/66 - 143/85)  BP(mean): 95 (14 May 2019 22:00) (71 - 103)  RR: 18 (15 May 2019 10:17) (16 - 22)  SpO2: 97% (15 May 2019 10:17) (94% - 98%)    EXAM:  Gen:       alert, in NAD  Lungs:       unlabored breathing  CV:       regular rate, rhythm  Abd:       nondistended, appropriately tender over surgical site                 incisions clean, dry, intact                 paulino in place, set to gravity, functioning                NGT in place, set to LCWS, flushed and functioning                DEREK drain x 1 in LLQ with SS output                midline laparotomy with staples in place, c/d/i

## 2019-05-15 NOTE — PROGRESS NOTE ADULT - SUBJECTIVE AND OBJECTIVE BOX
Anesthesia Pain Management Service    SUBJECTIVE: I had to go back to the OR last night     Pain Scale Score	At rest: _5__ 	With Activity: ___ 	[X ] Refer to charted pain scores    THERAPY:    [ ] IV PCA Morphine		[ ] 5 mg/mL	[ ] 1 mg/mL  [X ] IV PCA Hydromorphone	[ ] 5 mg/mL	[X ] 1 mg/mL  [ ] IV PCA Fentanyl		[ ] 50 micrograms/mL    Demand dose __0.2_ lockout __6_ (minutes) Continuous Rate _0__ Total: _2__  mg used (in past 24 hours)      MEDICATIONS  (STANDING):  enoxaparin Injectable 40 milliGRAM(s) SubCutaneous daily  HYDROmorphone PCA (1 mG/mL) 30 milliLiter(s) PCA Continuous PCA Continuous  lactated ringers. 1000 milliLiter(s) (100 mL/Hr) IV Continuous <Continuous>  pantoprazole  Injectable 40 milliGRAM(s) IV Push daily  piperacillin/tazobactam IVPB. 3.375 Gram(s) IV Intermittent every 8 hours    MEDICATIONS  (PRN):  HYDROmorphone PCA (1 mG/mL) Rescue Clinician Bolus 0.5 milliGRAM(s) IV Push every 15 minutes PRN for Pain Scale GREATER THAN 6  naloxone Injectable 0.1 milliGRAM(s) IV Push every 3 minutes PRN For ANY of the following changes in patient status:  A. RR LESS THAN 10 breaths per minute, B. Oxygen saturation LESS THAN 90%, C. Sedation score of 6  ondansetron Injectable 4 milliGRAM(s) IV Push every 6 hours PRN Nausea  ondansetron Injectable 4 milliGRAM(s) IV Push once PRN Nausea and/or Vomiting      OBJECTIVE: laying in bed     Sedation Score:	[ X] Alert	[ ] Drowsy 	[ ] Arousable	[ ] Asleep	[ ] Unresponsive    Side Effects:	[X ] None	[ ] Nausea	[ ] Vomiting	[ ] Pruritus  		[ ] Other:    Vital Signs Last 24 Hrs  T(C): 37 (15 May 2019 09:50), Max: 37 (15 May 2019 09:50)  T(F): 98.6 (15 May 2019 09:50), Max: 98.6 (15 May 2019 09:50)  HR: 94 (15 May 2019 09:50) (75 - 100)  BP: 137/76 (15 May 2019 09:50) (111/78 - 143/85)  BP(mean): 95 (14 May 2019 22:00) (71 - 103)  RR: 18 (15 May 2019 09:50) (16 - 22)  SpO2: 95% (15 May 2019 09:50) (93% - 98%)    ASSESSMENT/ PLAN    Therapy to  be:	[ X] Continue   [ ] Discontinued   [ ] Change to prn Analgesics    Documentation and Verification of current medications:   [X] Done	[ ] Not done, not elligible    Comments: NPO, continue current therapy

## 2019-05-15 NOTE — CHART NOTE - NSCHARTNOTEFT_GEN_A_CORE
General Surgery Note    CXR reviewed - NGT appears to be terminating just adjacent to pylorus. NGT pulled back on patient by ~1 inch.  Patient states he feels uncomfortable but denies abdominal pain, states discomfort has improved somewhat since NGT has been on placed    On Physical exam:  Gen: Laying in bed, in NAD  Abd: Soft, distended. Appropriately mildly tender around incision site, which is c/d/i    Will continue to monitor closely for NGT output and with serial abdominal exams    GRIS Monroy pGY2  [facility] team surgery  [id number]

## 2019-05-16 LAB
ANION GAP SERPL CALC-SCNC: 13 MMO/L — SIGNIFICANT CHANGE UP (ref 7–14)
BUN SERPL-MCNC: 21 MG/DL — SIGNIFICANT CHANGE UP (ref 7–23)
CALCIUM SERPL-MCNC: 8.7 MG/DL — SIGNIFICANT CHANGE UP (ref 8.4–10.5)
CHLORIDE SERPL-SCNC: 102 MMOL/L — SIGNIFICANT CHANGE UP (ref 98–107)
CO2 SERPL-SCNC: 26 MMOL/L — SIGNIFICANT CHANGE UP (ref 22–31)
CREAT SERPL-MCNC: 1.12 MG/DL — SIGNIFICANT CHANGE UP (ref 0.5–1.3)
GLUCOSE SERPL-MCNC: 152 MG/DL — HIGH (ref 70–99)
HCT VFR BLD CALC: 23.1 % — LOW (ref 39–50)
HGB BLD-MCNC: 7.7 G/DL — LOW (ref 13–17)
MAGNESIUM SERPL-MCNC: 2 MG/DL — SIGNIFICANT CHANGE UP (ref 1.6–2.6)
MCHC RBC-ENTMCNC: 28.7 PG — SIGNIFICANT CHANGE UP (ref 27–34)
MCHC RBC-ENTMCNC: 33.3 % — SIGNIFICANT CHANGE UP (ref 32–36)
MCV RBC AUTO: 86.2 FL — SIGNIFICANT CHANGE UP (ref 80–100)
NRBC # FLD: 0.02 K/UL — SIGNIFICANT CHANGE UP (ref 0–0)
PHOSPHATE SERPL-MCNC: 2.6 MG/DL — SIGNIFICANT CHANGE UP (ref 2.5–4.5)
PLATELET # BLD AUTO: 286 K/UL — SIGNIFICANT CHANGE UP (ref 150–400)
PMV BLD: 9.4 FL — SIGNIFICANT CHANGE UP (ref 7–13)
POTASSIUM SERPL-MCNC: 4.2 MMOL/L — SIGNIFICANT CHANGE UP (ref 3.5–5.3)
POTASSIUM SERPL-SCNC: 4.2 MMOL/L — SIGNIFICANT CHANGE UP (ref 3.5–5.3)
RBC # BLD: 2.68 M/UL — LOW (ref 4.2–5.8)
RBC # FLD: 13.7 % — SIGNIFICANT CHANGE UP (ref 10.3–14.5)
SODIUM SERPL-SCNC: 141 MMOL/L — SIGNIFICANT CHANGE UP (ref 135–145)
SURGICAL PATHOLOGY STUDY: SIGNIFICANT CHANGE UP
WBC # BLD: 9.69 K/UL — SIGNIFICANT CHANGE UP (ref 3.8–10.5)
WBC # FLD AUTO: 9.69 K/UL — SIGNIFICANT CHANGE UP (ref 3.8–10.5)

## 2019-05-16 RX ORDER — ACETAMINOPHEN 500 MG
1000 TABLET ORAL ONCE
Refills: 0 | Status: COMPLETED | OUTPATIENT
Start: 2019-05-16 | End: 2019-05-16

## 2019-05-16 RX ORDER — METOPROLOL TARTRATE 50 MG
5 TABLET ORAL EVERY 6 HOURS
Refills: 0 | Status: DISCONTINUED | OUTPATIENT
Start: 2019-05-16 | End: 2019-05-20

## 2019-05-16 RX ORDER — NIFEDIPINE 30 MG
20 TABLET, EXTENDED RELEASE 24 HR ORAL THREE TIMES A DAY
Refills: 0 | Status: DISCONTINUED | OUTPATIENT
Start: 2019-05-16 | End: 2019-05-23

## 2019-05-16 RX ORDER — BENZOCAINE AND MENTHOL 5; 1 G/100ML; G/100ML
1 LIQUID ORAL
Refills: 0 | Status: DISCONTINUED | OUTPATIENT
Start: 2019-05-16 | End: 2019-05-16

## 2019-05-16 RX ORDER — FUROSEMIDE 40 MG
20 TABLET ORAL
Refills: 0 | Status: DISCONTINUED | OUTPATIENT
Start: 2019-05-16 | End: 2019-05-16

## 2019-05-16 RX ORDER — ACETAMINOPHEN 500 MG
1000 TABLET ORAL ONCE
Refills: 0 | Status: COMPLETED | OUTPATIENT
Start: 2019-05-17 | End: 2019-05-17

## 2019-05-16 RX ORDER — LOSARTAN POTASSIUM 100 MG/1
50 TABLET, FILM COATED ORAL DAILY
Refills: 0 | Status: DISCONTINUED | OUTPATIENT
Start: 2019-05-16 | End: 2019-05-23

## 2019-05-16 RX ORDER — METOPROLOL TARTRATE 50 MG
5 TABLET ORAL EVERY 6 HOURS
Refills: 0 | Status: DISCONTINUED | OUTPATIENT
Start: 2019-05-16 | End: 2019-05-16

## 2019-05-16 RX ORDER — NIFEDIPINE 30 MG
60 TABLET, EXTENDED RELEASE 24 HR ORAL DAILY
Refills: 0 | Status: DISCONTINUED | OUTPATIENT
Start: 2019-05-16 | End: 2019-05-16

## 2019-05-16 RX ORDER — BENZOCAINE AND MENTHOL 5; 1 G/100ML; G/100ML
1 LIQUID ORAL
Refills: 0 | Status: DISCONTINUED | OUTPATIENT
Start: 2019-05-16 | End: 2019-05-18

## 2019-05-16 RX ADMIN — PIPERACILLIN AND TAZOBACTAM 25 GRAM(S): 4; .5 INJECTION, POWDER, LYOPHILIZED, FOR SOLUTION INTRAVENOUS at 09:43

## 2019-05-16 RX ADMIN — ENOXAPARIN SODIUM 40 MILLIGRAM(S): 100 INJECTION SUBCUTANEOUS at 17:13

## 2019-05-16 RX ADMIN — HYDROMORPHONE HYDROCHLORIDE 30 MILLILITER(S): 2 INJECTION INTRAMUSCULAR; INTRAVENOUS; SUBCUTANEOUS at 19:59

## 2019-05-16 RX ADMIN — PIPERACILLIN AND TAZOBACTAM 25 GRAM(S): 4; .5 INJECTION, POWDER, LYOPHILIZED, FOR SOLUTION INTRAVENOUS at 00:25

## 2019-05-16 RX ADMIN — PANTOPRAZOLE SODIUM 40 MILLIGRAM(S): 20 TABLET, DELAYED RELEASE ORAL at 09:42

## 2019-05-16 RX ADMIN — Medication 5 MILLIGRAM(S): at 15:10

## 2019-05-16 RX ADMIN — Medication 1000 MILLIGRAM(S): at 20:26

## 2019-05-16 RX ADMIN — Medication 400 MILLIGRAM(S): at 19:56

## 2019-05-16 RX ADMIN — Medication 400 MILLIGRAM(S): at 13:48

## 2019-05-16 RX ADMIN — Medication 1000 MILLIGRAM(S): at 14:18

## 2019-05-16 RX ADMIN — HYDROMORPHONE HYDROCHLORIDE 30 MILLILITER(S): 2 INJECTION INTRAMUSCULAR; INTRAVENOUS; SUBCUTANEOUS at 08:11

## 2019-05-16 RX ADMIN — LOSARTAN POTASSIUM 50 MILLIGRAM(S): 100 TABLET, FILM COATED ORAL at 13:48

## 2019-05-16 RX ADMIN — SODIUM CHLORIDE 125 MILLILITER(S): 9 INJECTION, SOLUTION INTRAVENOUS at 13:49

## 2019-05-16 RX ADMIN — Medication 20 MILLIGRAM(S): at 22:12

## 2019-05-16 RX ADMIN — Medication 1000 MILLIGRAM(S): at 08:03

## 2019-05-16 RX ADMIN — BENZOCAINE AND MENTHOL 1 LOZENGE: 5; 1 LIQUID ORAL at 07:35

## 2019-05-16 RX ADMIN — PIPERACILLIN AND TAZOBACTAM 25 GRAM(S): 4; .5 INJECTION, POWDER, LYOPHILIZED, FOR SOLUTION INTRAVENOUS at 17:13

## 2019-05-16 RX ADMIN — BENZOCAINE AND MENTHOL 1 LOZENGE: 5; 1 LIQUID ORAL at 17:13

## 2019-05-16 RX ADMIN — Medication 5 MILLIGRAM(S): at 23:08

## 2019-05-16 RX ADMIN — Medication 400 MILLIGRAM(S): at 07:35

## 2019-05-16 NOTE — PROGRESS NOTE ADULT - SUBJECTIVE AND OBJECTIVE BOX
Anesthesia Pain Management Service    SUBJECTIVE: Patient is doing well with IV PCA and no significant problems reported.    Pain Scale Score	At rest: __5_ 	With Activity: ___ 	[X ] Refer to charted pain scores    THERAPY:    [ ] IV PCA Morphine		[ ] 5 mg/mL	[ ] 1 mg/mL  [X ] IV PCA Hydromorphone	[ ] 5 mg/mL	[X ] 1 mg/mL  [ ] IV PCA Fentanyl		[ ] 50 micrograms/mL    Demand dose __0.2_ lockout __6_ (minutes) Continuous Rate _0__ Total: _1.6__  mg used (in past 24 hours)      MEDICATIONS  (STANDING):  acetaminophen  IVPB .. 1000 milliGRAM(s) IV Intermittent once  acetaminophen  IVPB .. 1000 milliGRAM(s) IV Intermittent once  benzocaine 15 mG/menthol 3.6 mG Lozenge 1 Lozenge Oral four times a day  enoxaparin Injectable 40 milliGRAM(s) SubCutaneous daily  HYDROmorphone PCA (1 mG/mL) 30 milliLiter(s) PCA Continuous PCA Continuous  lactated ringers. 1000 milliLiter(s) (125 mL/Hr) IV Continuous <Continuous>  pantoprazole  Injectable 40 milliGRAM(s) IV Push daily  piperacillin/tazobactam IVPB. 3.375 Gram(s) IV Intermittent every 8 hours    MEDICATIONS  (PRN):  HYDROmorphone PCA (1 mG/mL) Rescue Clinician Bolus 0.5 milliGRAM(s) IV Push every 15 minutes PRN for Pain Scale GREATER THAN 6  LORazepam   Injectable 0.25 milliGRAM(s) IV Push daily PRN Anxiety  naloxone Injectable 0.1 milliGRAM(s) IV Push every 3 minutes PRN For ANY of the following changes in patient status:  A. RR LESS THAN 10 breaths per minute, B. Oxygen saturation LESS THAN 90%, C. Sedation score of 6  ondansetron Injectable 4 milliGRAM(s) IV Push every 6 hours PRN Nausea  ondansetron Injectable 4 milliGRAM(s) IV Push once PRN Nausea and/or Vomiting      OBJECTIVE: sitting in chair     Sedation Score:	[ X] Alert	[ ] Drowsy 	[ ] Arousable	[ ] Asleep	[ ] Unresponsive    Side Effects:	[X ] None	[ ] Nausea	[ ] Vomiting	[ ] Pruritus  		[ ] Other:    Vital Signs Last 24 Hrs  T(C): 37.2 (16 May 2019 10:17), Max: 37.2 (15 May 2019 14:58)  T(F): 98.9 (16 May 2019 10:17), Max: 98.9 (15 May 2019 14:58)  HR: 101 (16 May 2019 10:17) (92 - 101)  BP: 159/94 (16 May 2019 10:17) (134/78 - 159/94)  BP(mean): --  RR: 18 (16 May 2019 10:17) (17 - 18)  SpO2: 95% (16 May 2019 10:17) (95% - 100%)    ASSESSMENT/ PLAN    Therapy to  be:	[ X] Continue   [ ] Discontinued   [ ] Change to prn Analgesics    Documentation and Verification of current medications:   [X] Done	[ ] Not done, not elligible    Comments: NPO with NGT, continue current therapy

## 2019-05-16 NOTE — PROGRESS NOTE ADULT - SUBJECTIVE AND OBJECTIVE BOX
INTERVAL HPI/OVERNIGHT EVENTS: Pt seen and examined. NGT placed last night. Pt comfortable, denies nausea. No GIF. Afebrile.    POD#2: evacuation of abd wall flap hematoma  POD#3: ex-lap, SBR, repair of incisional hernia with Phasix    MEDICATIONS  (STANDING):  acetaminophen  IVPB .. 1000 milliGRAM(s) IV Intermittent once  acetaminophen  IVPB .. 1000 milliGRAM(s) IV Intermittent once  benzocaine 15 mG/menthol 3.6 mG Lozenge 1 Lozenge Oral four times a day  enoxaparin Injectable 40 milliGRAM(s) SubCutaneous daily  HYDROmorphone PCA (1 mG/mL) 30 milliLiter(s) PCA Continuous PCA Continuous  lactated ringers. 1000 milliLiter(s) (125 mL/Hr) IV Continuous <Continuous>  pantoprazole  Injectable 40 milliGRAM(s) IV Push daily  piperacillin/tazobactam IVPB. 3.375 Gram(s) IV Intermittent every 8 hours    MEDICATIONS  (PRN):  HYDROmorphone PCA (1 mG/mL) Rescue Clinician Bolus 0.5 milliGRAM(s) IV Push every 15 minutes PRN for Pain Scale GREATER THAN 6  LORazepam   Injectable 0.25 milliGRAM(s) IV Push daily PRN Anxiety  naloxone Injectable 0.1 milliGRAM(s) IV Push every 3 minutes PRN For ANY of the following changes in patient status:  A. RR LESS THAN 10 breaths per minute, B. Oxygen saturation LESS THAN 90%, C. Sedation score of 6  ondansetron Injectable 4 milliGRAM(s) IV Push every 6 hours PRN Nausea  ondansetron Injectable 4 milliGRAM(s) IV Push once PRN Nausea and/or Vomiting      Vital Signs Last 24 Hrs  T(C): 36.8 (16 May 2019 05:09), Max: 37.2 (15 May 2019 14:58)  T(F): 98.2 (16 May 2019 05:09), Max: 98.9 (15 May 2019 14:58)  HR: 96 (16 May 2019 05:09) (74 - 97)  BP: 158/89 (16 May 2019 05:09) (106/66 - 158/89)  BP(mean): --  RR: 18 (16 May 2019 05:09) (17 - 18)  SpO2: 96% (16 May 2019 05:09) (96% - 100%)    PHYSICAL EXAM:      Constitutional: NAD, pt resting comfortably in hospital bed    Gastrointestinal: Abd softly distended, nontender to palp. Dressing c/d/i. Drain with SSF. NGT in place on suction.              I&O's Detail    15 May 2019 07:01  -  16 May 2019 07:00  --------------------------------------------------------  IN:    IV PiggyBack: 400 mL    lactated ringers.: 2400 mL    Oral Fluid: 80 mL  Total IN: 2880 mL    OUT:    Bulb: 65 mL    Indwelling Catheter - Urethral: 250 mL    Nasoenteral Tube: 700 mL    Voided: 815 mL  Total OUT: 1830 mL    Total NET: 1050 mL          LABS:                        7.7    9.69  )-----------( 286      ( 16 May 2019 06:00 )             23.1     05-16    141  |  102  |  21  ----------------------------<  152<H>  4.2   |  26  |  1.12    Ca    8.7      16 May 2019 05:45  Phos  2.6     05-16  Mg     2.0     05-16      PT/INR - ( 14 May 2019 15:50 )   PT: 13.7 SEC;   INR: 1.20          PTT - ( 14 May 2019 15:50 )  PTT:28.9 SEC      RADIOLOGY & ADDITIONAL STUDIES:

## 2019-05-16 NOTE — PROGRESS NOTE ADULT - SUBJECTIVE AND OBJECTIVE BOX
Anesthesia Pain Management Service    SUBJECTIVE:    Therapy:	  [x ] IV PCA	   [ ] Epidural           [ ] s/p Spinal Opoid              [ ] Postpartum infusion	  [ ] Patient controlled regional anesthesia (PCRA)    [ ] prn Analgesics    OBJECTIVE:   [ x] No new signs     [ ] Other:    Side Effects:  [x ] None			[ ] Other:    Assessment of Catheter Site:		[ ] Intact		[ ] Other:    ASSESSMENT/PLAN  [x ] Continue current therapy    [ ] Therapy changed to:    [ ] IV PCA       [ ] Epidural     [ ] prn Analgesics     Comments: NGT with NPO

## 2019-05-16 NOTE — PROGRESS NOTE ADULT - SUBJECTIVE AND OBJECTIVE BOX
[facility] DAILY PROGRESS NOTE    SUBJECTIVE:  -  NGT placed overnight due to nausea, reflux, distension and pain  -  pt feeling better since NGT, put out 700 overnight  -  no flatus or bm    OBJECTIVE:    Vital Signs Last 24 Hrs  T(C): 36.3 (16 May 2019 13:46), Max: 37.2 (16 May 2019 10:17)  T(F): 97.3 (16 May 2019 13:46), Max: 98.9 (16 May 2019 10:17)  HR: 113 (16 May 2019 15:09) (94 - 113)  BP: 158/90 (16 May 2019 15:09) (134/78 - 159/94)  BP(mean): --  RR: 18 (16 May 2019 13:46) (17 - 18)  SpO2: 97% (16 May 2019 13:46) (95% - 100%)    EXAM:  Gen:       alert, in NAD  Lungs:       unlabored breathing  CV:       regular rate, rhythm  Abd:       nondistended, appropriately tender over surgical site                 incisions clean, dry, intact                 paulino in place, set to gravity, functioning                NGT in place, set to LCWS, flushed and functioning                DEREK drain x 1 in LLQ with SS output                midline laparotomy with staples in place, c/d/i

## 2019-05-17 LAB
ANION GAP SERPL CALC-SCNC: 15 MMO/L — HIGH (ref 7–14)
BUN SERPL-MCNC: 16 MG/DL — SIGNIFICANT CHANGE UP (ref 7–23)
CALCIUM SERPL-MCNC: 8.7 MG/DL — SIGNIFICANT CHANGE UP (ref 8.4–10.5)
CHLORIDE SERPL-SCNC: 99 MMOL/L — SIGNIFICANT CHANGE UP (ref 98–107)
CO2 SERPL-SCNC: 25 MMOL/L — SIGNIFICANT CHANGE UP (ref 22–31)
CREAT SERPL-MCNC: 0.96 MG/DL — SIGNIFICANT CHANGE UP (ref 0.5–1.3)
GLUCOSE SERPL-MCNC: 148 MG/DL — HIGH (ref 70–99)
HCT VFR BLD CALC: 22.1 % — LOW (ref 39–50)
HGB BLD-MCNC: 7.5 G/DL — LOW (ref 13–17)
MAGNESIUM SERPL-MCNC: 1.8 MG/DL — SIGNIFICANT CHANGE UP (ref 1.6–2.6)
MCHC RBC-ENTMCNC: 29.6 PG — SIGNIFICANT CHANGE UP (ref 27–34)
MCHC RBC-ENTMCNC: 33.9 % — SIGNIFICANT CHANGE UP (ref 32–36)
MCV RBC AUTO: 87.4 FL — SIGNIFICANT CHANGE UP (ref 80–100)
NRBC # FLD: 0 K/UL — SIGNIFICANT CHANGE UP (ref 0–0)
PHOSPHATE SERPL-MCNC: 2.6 MG/DL — SIGNIFICANT CHANGE UP (ref 2.5–4.5)
PLATELET # BLD AUTO: 344 K/UL — SIGNIFICANT CHANGE UP (ref 150–400)
PMV BLD: 9.1 FL — SIGNIFICANT CHANGE UP (ref 7–13)
POTASSIUM SERPL-MCNC: 3.5 MMOL/L — SIGNIFICANT CHANGE UP (ref 3.5–5.3)
POTASSIUM SERPL-SCNC: 3.5 MMOL/L — SIGNIFICANT CHANGE UP (ref 3.5–5.3)
RBC # BLD: 2.53 M/UL — LOW (ref 4.2–5.8)
RBC # FLD: 13.6 % — SIGNIFICANT CHANGE UP (ref 10.3–14.5)
SODIUM SERPL-SCNC: 139 MMOL/L — SIGNIFICANT CHANGE UP (ref 135–145)
WBC # BLD: 13.22 K/UL — HIGH (ref 3.8–10.5)
WBC # FLD AUTO: 13.22 K/UL — HIGH (ref 3.8–10.5)

## 2019-05-17 RX ORDER — HYDROMORPHONE HYDROCHLORIDE 2 MG/ML
0.5 INJECTION INTRAMUSCULAR; INTRAVENOUS; SUBCUTANEOUS
Refills: 0 | Status: DISCONTINUED | OUTPATIENT
Start: 2019-05-17 | End: 2019-05-18

## 2019-05-17 RX ORDER — ACETAMINOPHEN 500 MG
1000 TABLET ORAL ONCE
Refills: 0 | Status: COMPLETED | OUTPATIENT
Start: 2019-05-17 | End: 2019-05-17

## 2019-05-17 RX ORDER — MAGNESIUM SULFATE 500 MG/ML
1 VIAL (ML) INJECTION ONCE
Refills: 0 | Status: COMPLETED | OUTPATIENT
Start: 2019-05-17 | End: 2019-05-17

## 2019-05-17 RX ORDER — ACETAMINOPHEN 500 MG
1000 TABLET ORAL ONCE
Refills: 0 | Status: COMPLETED | OUTPATIENT
Start: 2019-05-18 | End: 2019-05-18

## 2019-05-17 RX ORDER — SODIUM CHLORIDE 9 MG/ML
1000 INJECTION, SOLUTION INTRAVENOUS
Refills: 0 | Status: DISCONTINUED | OUTPATIENT
Start: 2019-05-17 | End: 2019-05-17

## 2019-05-17 RX ORDER — DEXTROSE MONOHYDRATE, SODIUM CHLORIDE, AND POTASSIUM CHLORIDE 50; .745; 4.5 G/1000ML; G/1000ML; G/1000ML
1000 INJECTION, SOLUTION INTRAVENOUS
Refills: 0 | Status: DISCONTINUED | OUTPATIENT
Start: 2019-05-17 | End: 2019-05-18

## 2019-05-17 RX ORDER — POTASSIUM PHOSPHATE, MONOBASIC POTASSIUM PHOSPHATE, DIBASIC 236; 224 MG/ML; MG/ML
15 INJECTION, SOLUTION INTRAVENOUS ONCE
Refills: 0 | Status: COMPLETED | OUTPATIENT
Start: 2019-05-17 | End: 2019-05-17

## 2019-05-17 RX ORDER — HYDROMORPHONE HYDROCHLORIDE 2 MG/ML
0.25 INJECTION INTRAMUSCULAR; INTRAVENOUS; SUBCUTANEOUS
Refills: 0 | Status: DISCONTINUED | OUTPATIENT
Start: 2019-05-17 | End: 2019-05-18

## 2019-05-17 RX ADMIN — PANTOPRAZOLE SODIUM 40 MILLIGRAM(S): 20 TABLET, DELAYED RELEASE ORAL at 09:43

## 2019-05-17 RX ADMIN — Medication 1000 MILLIGRAM(S): at 15:35

## 2019-05-17 RX ADMIN — DEXTROSE MONOHYDRATE, SODIUM CHLORIDE, AND POTASSIUM CHLORIDE 75 MILLILITER(S): 50; .745; 4.5 INJECTION, SOLUTION INTRAVENOUS at 09:43

## 2019-05-17 RX ADMIN — HYDROMORPHONE HYDROCHLORIDE 30 MILLILITER(S): 2 INJECTION INTRAMUSCULAR; INTRAVENOUS; SUBCUTANEOUS at 08:17

## 2019-05-17 RX ADMIN — LOSARTAN POTASSIUM 50 MILLIGRAM(S): 100 TABLET, FILM COATED ORAL at 14:52

## 2019-05-17 RX ADMIN — PIPERACILLIN AND TAZOBACTAM 25 GRAM(S): 4; .5 INJECTION, POWDER, LYOPHILIZED, FOR SOLUTION INTRAVENOUS at 09:43

## 2019-05-17 RX ADMIN — PIPERACILLIN AND TAZOBACTAM 25 GRAM(S): 4; .5 INJECTION, POWDER, LYOPHILIZED, FOR SOLUTION INTRAVENOUS at 01:05

## 2019-05-17 RX ADMIN — Medication 1000 MILLIGRAM(S): at 10:35

## 2019-05-17 RX ADMIN — Medication 1000 MILLIGRAM(S): at 20:45

## 2019-05-17 RX ADMIN — Medication 5 MILLIGRAM(S): at 12:12

## 2019-05-17 RX ADMIN — PIPERACILLIN AND TAZOBACTAM 25 GRAM(S): 4; .5 INJECTION, POWDER, LYOPHILIZED, FOR SOLUTION INTRAVENOUS at 18:56

## 2019-05-17 RX ADMIN — Medication 5 MILLIGRAM(S): at 18:53

## 2019-05-17 RX ADMIN — Medication 100 GRAM(S): at 09:44

## 2019-05-17 RX ADMIN — Medication 400 MILLIGRAM(S): at 09:54

## 2019-05-17 RX ADMIN — Medication 400 MILLIGRAM(S): at 14:58

## 2019-05-17 RX ADMIN — Medication 20 MILLIGRAM(S): at 14:58

## 2019-05-17 RX ADMIN — Medication 20 MILLIGRAM(S): at 22:08

## 2019-05-17 RX ADMIN — ENOXAPARIN SODIUM 40 MILLIGRAM(S): 100 INJECTION SUBCUTANEOUS at 18:55

## 2019-05-17 RX ADMIN — Medication 400 MILLIGRAM(S): at 01:04

## 2019-05-17 RX ADMIN — Medication 5 MILLIGRAM(S): at 05:02

## 2019-05-17 RX ADMIN — Medication 400 MILLIGRAM(S): at 20:30

## 2019-05-17 RX ADMIN — POTASSIUM PHOSPHATE, MONOBASIC POTASSIUM PHOSPHATE, DIBASIC 62.5 MILLIMOLE(S): 236; 224 INJECTION, SOLUTION INTRAVENOUS at 14:59

## 2019-05-17 RX ADMIN — BENZOCAINE AND MENTHOL 1 LOZENGE: 5; 1 LIQUID ORAL at 12:12

## 2019-05-17 RX ADMIN — Medication 1000 MILLIGRAM(S): at 01:35

## 2019-05-17 RX ADMIN — Medication 20 MILLIGRAM(S): at 05:02

## 2019-05-17 NOTE — PROGRESS NOTE ADULT - SUBJECTIVE AND OBJECTIVE BOX
Morning Surgical Progress Note  Patient is a 62yo M POD4 s/p incisional hernia repair with Phasix mesh and POD3 RTOR for abdominal wall hematoma.    SUBJECTIVE: Patient seen and examined at bedside with surgical team. This morning, c/o discomfort due to NGT. Denies abdominal pain, N/V. Reports flatus, denies BM.    Vital Signs Last 24 Hrs  T(C): 36.8 (17 May 2019 05:00), Max: 37.2 (16 May 2019 10:17)  T(F): 98.3 (17 May 2019 05:00), Max: 98.9 (16 May 2019 10:17)  HR: 96 (17 May 2019 05:15) (92 - 113)  BP: 142/83 (17 May 2019 05:15) (137/72 - 159/94)  BP(mean): --  RR: 16 (17 May 2019 05:00) (16 - 18)  SpO2: 95% (17 May 2019 05:00) (94% - 97%)I&O's Detail    16 May 2019 07:01  -  17 May 2019 07:00  --------------------------------------------------------  IN:    IV PiggyBack: 600 mL    lactated ringers.: 3000 mL  Total IN: 3600 mL    OUT:    Bulb: 62 mL    Nasoenteral Tube: 950 mL    Voided: 1325 mL  Total OUT: 2337 mL    Total NET: 1263 mL      17 May 2019 07:01  -  17 May 2019 09:33  --------------------------------------------------------  IN:  Total IN: 0 mL    OUT:    Voided: 150 mL  Total OUT: 150 mL    Total NET: -150 mL      MEDICATIONS  (STANDING):  acetaminophen  IVPB .. 1000 milliGRAM(s) IV Intermittent once  acetaminophen  IVPB .. 1000 milliGRAM(s) IV Intermittent once  acetaminophen  IVPB .. 1000 milliGRAM(s) IV Intermittent once  benzocaine 15 mG/menthol 3.6 mG Lozenge 1 Lozenge Oral four times a day  dextrose 5% + sodium chloride 0.45% with potassium chloride 20 mEq/L 1000 milliLiter(s) (75 mL/Hr) IV Continuous <Continuous>  enoxaparin Injectable 40 milliGRAM(s) SubCutaneous daily  losartan 50 milliGRAM(s) Oral daily  magnesium sulfate  IVPB 1 Gram(s) IV Intermittent once  metoprolol tartrate Injectable 5 milliGRAM(s) IV Push every 6 hours  NIFEdipine IR 20 milliGRAM(s) Oral three times a day  pantoprazole  Injectable 40 milliGRAM(s) IV Push daily  piperacillin/tazobactam IVPB. 3.375 Gram(s) IV Intermittent every 8 hours  potassium phosphate IVPB 15 milliMole(s) IV Intermittent once    MEDICATIONS  (PRN):  LORazepam   Injectable 0.25 milliGRAM(s) IV Push daily PRN Anxiety      Physical Exam  Constitutional: A+Ox3, NAD  Respiratory: Nonlabored breathing.  Gastrointestinal: Distended, soft. NTTP, No rebound or guarding. Midline dressing intact with mild strikethrough. Mild ecchymosis surrounding incision. JPx1 with SS output.    LABS:                        7.5    13.22 )-----------( 344      ( 17 May 2019 06:30 )             22.1     05-17    139  |  99  |  16  ----------------------------<  148<H>  3.5   |  25  |  0.96    Ca    8.7      17 May 2019 06:30  Phos  2.6     05-17  Mg     1.8     05-17    Assessment:  62y/o s/p laparotomy, repair incisional hernia, SBR. RTOR on POD#1 for abd wall hematoma. Post operative ileus improving. Now with post- operative anemia (H/H 7.5/22.1).    Plan:  - transfuse 1u PRBC  - NGT clamp trial  - decrease IVF 75cc/h  - d/c PCA  - OOB/ ambulate    [facility]  [id number]

## 2019-05-17 NOTE — PROGRESS NOTE ADULT - SUBJECTIVE AND OBJECTIVE BOX
INTERVAL HPI/OVERNIGHT EVENTS: Pt feels tired. No abd pain. Passing flatus.    STATUS POST:  laparot, SBR, repair incisional hernia with Phasix. Returned to OR on POD#1 for evacuation abd wall hematoma    POST OPERATIVE DAY #: 4/3    MEDICATIONS  (STANDING):  benzocaine 15 mG/menthol 3.6 mG Lozenge 1 Lozenge Oral four times a day  enoxaparin Injectable 40 milliGRAM(s) SubCutaneous daily  HYDROmorphone PCA (1 mG/mL) 30 milliLiter(s) PCA Continuous PCA Continuous  losartan 50 milliGRAM(s) Oral daily  magnesium sulfate  IVPB 1 Gram(s) IV Intermittent once  metoprolol tartrate Injectable 5 milliGRAM(s) IV Push every 6 hours  NIFEdipine IR 20 milliGRAM(s) Oral three times a day  pantoprazole  Injectable 40 milliGRAM(s) IV Push daily  piperacillin/tazobactam IVPB. 3.375 Gram(s) IV Intermittent every 8 hours  potassium phosphate IVPB 15 milliMole(s) IV Intermittent once    MEDICATIONS  (PRN):  HYDROmorphone PCA (1 mG/mL) Rescue Clinician Bolus 0.5 milliGRAM(s) IV Push every 15 minutes PRN for Pain Scale GREATER THAN 6  LORazepam   Injectable 0.25 milliGRAM(s) IV Push daily PRN Anxiety  naloxone Injectable 0.1 milliGRAM(s) IV Push every 3 minutes PRN For ANY of the following changes in patient status:  A. RR LESS THAN 10 breaths per minute, B. Oxygen saturation LESS THAN 90%, C. Sedation score of 6  ondansetron Injectable 4 milliGRAM(s) IV Push every 6 hours PRN Nausea  ondansetron Injectable 4 milliGRAM(s) IV Push once PRN Nausea and/or Vomiting      Vital Signs Last 24 Hrs  T(C): 36.8 (17 May 2019 05:00), Max: 37.2 (16 May 2019 10:17)  T(F): 98.3 (17 May 2019 05:00), Max: 98.9 (16 May 2019 10:17)  HR: 96 (17 May 2019 05:15) (92 - 113)  BP: 142/83 (17 May 2019 05:15) (137/72 - 159/94)  BP(mean): --  RR: 16 (17 May 2019 05:00) (16 - 18)  SpO2: 95% (17 May 2019 05:00) (94% - 97%)  I&O's Detail    16 May 2019 07:01  -  17 May 2019 07:00  --------------------------------------------------------  IN:    IV PiggyBack: 600 mL    lactated ringers.: 3000 mL  Total IN: 3600 mL    OUT:    Bulb: 62 mL    Nasoenteral Tube: 950 mL    Voided: 1325 mL  Total OUT: 2337 mL    Total NET: 1263 mL      17 May 2019 07:01  -  17 May 2019 08:42  --------------------------------------------------------  IN:  Total IN: 0 mL    OUT:    Voided: 150 mL  Total OUT: 150 mL    Total NET: -150 mL        Abdominal: Soft, NT, less distended, +BS, wound mild echymosis. Drain serosanguineous          LABS:                        7.5    13.22 )-----------( 344      ( 17 May 2019 06:30 )             22.1     05-17    139  |  99  |  16  ----------------------------<  148<H>  3.5   |  25  |  0.96    Ca    8.7      17 May 2019 06:30  Phos  2.6     05-17  Mg     1.8     05-17            RADIOLOGY & ADDITIONAL STUDIES:

## 2019-05-17 NOTE — PROGRESS NOTE ADULT - SUBJECTIVE AND OBJECTIVE BOX
Day __3_ of Anesthesia Pain Management Service    SUBJECTIVE:    Therapy:	  [ x] IV PCA	   [ ] Epidural           [ ] s/p Spinal Opoid              [ ] Postpartum infusion	  [ ] Patient controlled regional anesthesia (PCRA)    [ ] prn Analgesics    OBJECTIVE:   [ x] No new signs     [ ] Other:    Side Effects:  [ x] None			[ ] Other:    Assessment of Catheter Site:		[ ] Intact		[ ] Other:    ASSESSMENT/PLAN  [ ] Continue current therapy    [x ] Therapy changed to:    [ ] IV PCA       [ ] Epidural     [ x] prn Analgesics     Comments:  PCA d/c'ed earlier

## 2019-05-17 NOTE — PROGRESS NOTE ADULT - SUBJECTIVE AND OBJECTIVE BOX
Anesthesia Pain Management Service    SUBJECTIVE: IV PCA discontinued by team. Patient did well with IV PCA and no significant problems reported.      Pain Scale Score	At rest: _6/10__ 	With Activity: ___ 	[X ] Refer to charted pain scores    THERAPY:    [ ] IV PCA Morphine		[ ] 5 mg/mL	[ ] 1 mg/mL  [X ] IV PCA Hydromorphone	[ ] 5 mg/mL	[X ] 1 mg/mL  [ ] IV PCA Fentanyl		[ ] 50 micrograms/mL    Demand dose __0.2_ lockout __6_ (minutes) Continuous Rate _0__ Total: _2.2__   mg used (in past 24 hrs)      MEDICATIONS  (STANDING):  acetaminophen  IVPB .. 1000 milliGRAM(s) IV Intermittent once  acetaminophen  IVPB .. 1000 milliGRAM(s) IV Intermittent once  benzocaine 15 mG/menthol 3.6 mG Lozenge 1 Lozenge Oral four times a day  dextrose 5% + sodium chloride 0.45% with potassium chloride 20 mEq/L 1000 milliLiter(s) (75 mL/Hr) IV Continuous <Continuous>  enoxaparin Injectable 40 milliGRAM(s) SubCutaneous daily  losartan 50 milliGRAM(s) Oral daily  metoprolol tartrate Injectable 5 milliGRAM(s) IV Push every 6 hours  NIFEdipine IR 20 milliGRAM(s) Oral three times a day  pantoprazole  Injectable 40 milliGRAM(s) IV Push daily  piperacillin/tazobactam IVPB. 3.375 Gram(s) IV Intermittent every 8 hours  potassium phosphate IVPB 15 milliMole(s) IV Intermittent once    MEDICATIONS  (PRN):  HYDROmorphone  Injectable 0.5 milliGRAM(s) IV Push every 3 hours PRN Severe Pain (7 - 10)  HYDROmorphone  Injectable 0.25 milliGRAM(s) IV Push every 3 hours PRN Moderate Pain (4 - 6)  LORazepam   Injectable 0.25 milliGRAM(s) IV Push daily PRN Anxiety      OBJECTIVE:  Patient lying in bed, NPO with NGT.    Sedation Score:	[ X] Alert	[ ] Drowsy 	[ ] Arousable	[ ] Asleep	[ ] Unresponsive    Side Effects:	[X ] None	[ ] Nausea	[ ] Vomiting	[ ] Pruritus  		[ ] Other:    Vital Signs Last 24 Hrs  T(C): 37.1 (17 May 2019 10:28), Max: 37.1 (16 May 2019 17:55)  T(F): 98.8 (17 May 2019 10:28), Max: 98.8 (17 May 2019 10:28)  HR: 212 (17 May 2019 10:28) (92 - 212)  BP: 161/93 (17 May 2019 10:28) (137/72 - 161/93)  BP(mean): --  RR: 18 (17 May 2019 10:28) (16 - 18)  SpO2: 96% (17 May 2019 10:28) (94% - 97%)    ASSESSMENT/ PLAN    Therapy to  be:	[ ] Continue   [ X] Discontinued   [X ] Change to prn Analgesics    Documentation and Verification of current medications:   [X] Done	[ ] Not done, not elligible    Comments: Discussed patient with team. PRN IV opioids and/or Adjuvant non-opioid medication ordered at this point.

## 2019-05-18 LAB
ANION GAP SERPL CALC-SCNC: 11 MMO/L — SIGNIFICANT CHANGE UP (ref 7–14)
ANION GAP SERPL CALC-SCNC: 9 MMO/L — SIGNIFICANT CHANGE UP (ref 7–14)
APTT BLD: 33.2 SEC — SIGNIFICANT CHANGE UP (ref 27.5–36.3)
BLD GP AB SCN SERPL QL: NEGATIVE — SIGNIFICANT CHANGE UP
BUN SERPL-MCNC: 15 MG/DL — SIGNIFICANT CHANGE UP (ref 7–23)
BUN SERPL-MCNC: 17 MG/DL — SIGNIFICANT CHANGE UP (ref 7–23)
CALCIUM SERPL-MCNC: 8.3 MG/DL — LOW (ref 8.4–10.5)
CALCIUM SERPL-MCNC: 8.5 MG/DL — SIGNIFICANT CHANGE UP (ref 8.4–10.5)
CHLORIDE SERPL-SCNC: 100 MMOL/L — SIGNIFICANT CHANGE UP (ref 98–107)
CHLORIDE SERPL-SCNC: 103 MMOL/L — SIGNIFICANT CHANGE UP (ref 98–107)
CO2 SERPL-SCNC: 26 MMOL/L — SIGNIFICANT CHANGE UP (ref 22–31)
CO2 SERPL-SCNC: 28 MMOL/L — SIGNIFICANT CHANGE UP (ref 22–31)
CREAT SERPL-MCNC: 0.96 MG/DL — SIGNIFICANT CHANGE UP (ref 0.5–1.3)
CREAT SERPL-MCNC: 0.96 MG/DL — SIGNIFICANT CHANGE UP (ref 0.5–1.3)
GLUCOSE SERPL-MCNC: 165 MG/DL — HIGH (ref 70–99)
GLUCOSE SERPL-MCNC: 196 MG/DL — HIGH (ref 70–99)
HCT VFR BLD CALC: 23.8 % — LOW (ref 39–50)
HCT VFR BLD CALC: 24.1 % — LOW (ref 39–50)
HCT VFR BLD CALC: 25.2 % — LOW (ref 39–50)
HGB BLD-MCNC: 8.1 G/DL — LOW (ref 13–17)
HGB BLD-MCNC: 8.5 G/DL — LOW (ref 13–17)
HGB BLD-MCNC: 8.5 G/DL — LOW (ref 13–17)
INR BLD: 1.28 — HIGH (ref 0.88–1.17)
MAGNESIUM SERPL-MCNC: 1.9 MG/DL — SIGNIFICANT CHANGE UP (ref 1.6–2.6)
MAGNESIUM SERPL-MCNC: 2 MG/DL — SIGNIFICANT CHANGE UP (ref 1.6–2.6)
MCHC RBC-ENTMCNC: 28.4 PG — SIGNIFICANT CHANGE UP (ref 27–34)
MCHC RBC-ENTMCNC: 28.9 PG — SIGNIFICANT CHANGE UP (ref 27–34)
MCHC RBC-ENTMCNC: 29.5 PG — SIGNIFICANT CHANGE UP (ref 27–34)
MCHC RBC-ENTMCNC: 33.7 % — SIGNIFICANT CHANGE UP (ref 32–36)
MCHC RBC-ENTMCNC: 34 % — SIGNIFICANT CHANGE UP (ref 32–36)
MCHC RBC-ENTMCNC: 35.3 % — SIGNIFICANT CHANGE UP (ref 32–36)
MCV RBC AUTO: 83.5 FL — SIGNIFICANT CHANGE UP (ref 80–100)
MCV RBC AUTO: 83.7 FL — SIGNIFICANT CHANGE UP (ref 80–100)
MCV RBC AUTO: 85.7 FL — SIGNIFICANT CHANGE UP (ref 80–100)
NRBC # FLD: 0.02 K/UL — SIGNIFICANT CHANGE UP (ref 0–0)
PHOSPHATE SERPL-MCNC: 2.2 MG/DL — LOW (ref 2.5–4.5)
PHOSPHATE SERPL-MCNC: 2.6 MG/DL — SIGNIFICANT CHANGE UP (ref 2.5–4.5)
PLATELET # BLD AUTO: 301 K/UL — SIGNIFICANT CHANGE UP (ref 150–400)
PLATELET # BLD AUTO: 301 K/UL — SIGNIFICANT CHANGE UP (ref 150–400)
PLATELET # BLD AUTO: 317 K/UL — SIGNIFICANT CHANGE UP (ref 150–400)
PMV BLD: 8.3 FL — SIGNIFICANT CHANGE UP (ref 7–13)
PMV BLD: 8.7 FL — SIGNIFICANT CHANGE UP (ref 7–13)
PMV BLD: 9 FL — SIGNIFICANT CHANGE UP (ref 7–13)
POTASSIUM SERPL-MCNC: 3.2 MMOL/L — LOW (ref 3.5–5.3)
POTASSIUM SERPL-MCNC: 4.1 MMOL/L — SIGNIFICANT CHANGE UP (ref 3.5–5.3)
POTASSIUM SERPL-SCNC: 3.2 MMOL/L — LOW (ref 3.5–5.3)
POTASSIUM SERPL-SCNC: 4.1 MMOL/L — SIGNIFICANT CHANGE UP (ref 3.5–5.3)
PROTHROM AB SERPL-ACNC: 14.3 SEC — HIGH (ref 9.8–13.1)
RBC # BLD: 2.85 M/UL — LOW (ref 4.2–5.8)
RBC # BLD: 2.88 M/UL — LOW (ref 4.2–5.8)
RBC # BLD: 2.94 M/UL — LOW (ref 4.2–5.8)
RBC # FLD: 13.8 % — SIGNIFICANT CHANGE UP (ref 10.3–14.5)
RBC # FLD: 14.2 % — SIGNIFICANT CHANGE UP (ref 10.3–14.5)
RBC # FLD: 14.3 % — SIGNIFICANT CHANGE UP (ref 10.3–14.5)
RH IG SCN BLD-IMP: POSITIVE — SIGNIFICANT CHANGE UP
SODIUM SERPL-SCNC: 137 MMOL/L — SIGNIFICANT CHANGE UP (ref 135–145)
SODIUM SERPL-SCNC: 140 MMOL/L — SIGNIFICANT CHANGE UP (ref 135–145)
WBC # BLD: 11.29 K/UL — HIGH (ref 3.8–10.5)
WBC # BLD: 11.83 K/UL — HIGH (ref 3.8–10.5)
WBC # BLD: 13.47 K/UL — HIGH (ref 3.8–10.5)
WBC # FLD AUTO: 11.29 K/UL — HIGH (ref 3.8–10.5)
WBC # FLD AUTO: 11.83 K/UL — HIGH (ref 3.8–10.5)
WBC # FLD AUTO: 13.47 K/UL — HIGH (ref 3.8–10.5)

## 2019-05-18 PROCEDURE — 36247 INS CATH ABD/L-EXT ART 3RD: CPT

## 2019-05-18 PROCEDURE — 99291 CRITICAL CARE FIRST HOUR: CPT

## 2019-05-18 PROCEDURE — 37244 VASC EMBOLIZE/OCCLUDE BLEED: CPT

## 2019-05-18 PROCEDURE — 76937 US GUIDE VASCULAR ACCESS: CPT | Mod: 26

## 2019-05-18 PROCEDURE — 74178 CT ABD&PLV WO CNTR FLWD CNTR: CPT | Mod: 26

## 2019-05-18 PROCEDURE — 36245 INS CATH ABD/L-EXT ART 1ST: CPT | Mod: 59

## 2019-05-18 RX ORDER — OXYCODONE HYDROCHLORIDE 5 MG/1
10 TABLET ORAL EVERY 4 HOURS
Refills: 0 | Status: DISCONTINUED | OUTPATIENT
Start: 2019-05-18 | End: 2019-05-23

## 2019-05-18 RX ORDER — OXYCODONE HYDROCHLORIDE 5 MG/1
5 TABLET ORAL EVERY 4 HOURS
Refills: 0 | Status: DISCONTINUED | OUTPATIENT
Start: 2019-05-18 | End: 2019-05-23

## 2019-05-18 RX ORDER — ACETAMINOPHEN 500 MG
1000 TABLET ORAL ONCE
Refills: 0 | Status: COMPLETED | OUTPATIENT
Start: 2019-05-19 | End: 2019-05-19

## 2019-05-18 RX ORDER — DEXTROSE MONOHYDRATE, SODIUM CHLORIDE, AND POTASSIUM CHLORIDE 50; .745; 4.5 G/1000ML; G/1000ML; G/1000ML
1000 INJECTION, SOLUTION INTRAVENOUS
Refills: 0 | Status: DISCONTINUED | OUTPATIENT
Start: 2019-05-18 | End: 2019-05-18

## 2019-05-18 RX ORDER — ACETAMINOPHEN 500 MG
975 TABLET ORAL EVERY 6 HOURS
Refills: 0 | Status: DISCONTINUED | OUTPATIENT
Start: 2019-05-18 | End: 2019-05-18

## 2019-05-18 RX ORDER — POTASSIUM PHOSPHATE, MONOBASIC POTASSIUM PHOSPHATE, DIBASIC 236; 224 MG/ML; MG/ML
30 INJECTION, SOLUTION INTRAVENOUS ONCE
Refills: 0 | Status: COMPLETED | OUTPATIENT
Start: 2019-05-18 | End: 2019-05-18

## 2019-05-18 RX ORDER — POTASSIUM CHLORIDE 20 MEQ
10 PACKET (EA) ORAL
Refills: 0 | Status: COMPLETED | OUTPATIENT
Start: 2019-05-18 | End: 2019-05-18

## 2019-05-18 RX ORDER — PHYTONADIONE (VIT K1) 5 MG
5 TABLET ORAL ONCE
Refills: 0 | Status: COMPLETED | OUTPATIENT
Start: 2019-05-18 | End: 2019-05-18

## 2019-05-18 RX ORDER — ACETAMINOPHEN 500 MG
1000 TABLET ORAL ONCE
Refills: 0 | Status: COMPLETED | OUTPATIENT
Start: 2019-05-18 | End: 2019-05-18

## 2019-05-18 RX ORDER — DEXTROSE MONOHYDRATE, SODIUM CHLORIDE, AND POTASSIUM CHLORIDE 50; .745; 4.5 G/1000ML; G/1000ML; G/1000ML
1000 INJECTION, SOLUTION INTRAVENOUS
Refills: 0 | Status: DISCONTINUED | OUTPATIENT
Start: 2019-05-18 | End: 2019-05-21

## 2019-05-18 RX ADMIN — Medication 975 MILLIGRAM(S): at 12:31

## 2019-05-18 RX ADMIN — Medication 5 MILLIGRAM(S): at 05:27

## 2019-05-18 RX ADMIN — PANTOPRAZOLE SODIUM 40 MILLIGRAM(S): 20 TABLET, DELAYED RELEASE ORAL at 12:32

## 2019-05-18 RX ADMIN — Medication 975 MILLIGRAM(S): at 13:10

## 2019-05-18 RX ADMIN — Medication 100 MILLIEQUIVALENT(S): at 12:32

## 2019-05-18 RX ADMIN — Medication 5 MILLIGRAM(S): at 18:38

## 2019-05-18 RX ADMIN — Medication 5 MILLIGRAM(S): at 01:00

## 2019-05-18 RX ADMIN — PIPERACILLIN AND TAZOBACTAM 25 GRAM(S): 4; .5 INJECTION, POWDER, LYOPHILIZED, FOR SOLUTION INTRAVENOUS at 01:44

## 2019-05-18 RX ADMIN — Medication 1000 MILLIGRAM(S): at 19:20

## 2019-05-18 RX ADMIN — Medication 1000 MILLIGRAM(S): at 03:57

## 2019-05-18 RX ADMIN — Medication 400 MILLIGRAM(S): at 03:27

## 2019-05-18 RX ADMIN — POTASSIUM PHOSPHATE, MONOBASIC POTASSIUM PHOSPHATE, DIBASIC 83.33 MILLIMOLE(S): 236; 224 INJECTION, SOLUTION INTRAVENOUS at 05:29

## 2019-05-18 RX ADMIN — PIPERACILLIN AND TAZOBACTAM 25 GRAM(S): 4; .5 INJECTION, POWDER, LYOPHILIZED, FOR SOLUTION INTRAVENOUS at 18:23

## 2019-05-18 RX ADMIN — LOSARTAN POTASSIUM 50 MILLIGRAM(S): 100 TABLET, FILM COATED ORAL at 15:02

## 2019-05-18 RX ADMIN — Medication 63.75 MILLIMOLE(S): at 18:21

## 2019-05-18 RX ADMIN — Medication 100 MILLIEQUIVALENT(S): at 14:30

## 2019-05-18 RX ADMIN — PIPERACILLIN AND TAZOBACTAM 25 GRAM(S): 4; .5 INJECTION, POWDER, LYOPHILIZED, FOR SOLUTION INTRAVENOUS at 10:08

## 2019-05-18 RX ADMIN — Medication 20 MILLIGRAM(S): at 15:02

## 2019-05-18 RX ADMIN — Medication 100 MILLIEQUIVALENT(S): at 17:08

## 2019-05-18 RX ADMIN — Medication 5 MILLIGRAM(S): at 18:23

## 2019-05-18 RX ADMIN — Medication 5 MILLIGRAM(S): at 12:35

## 2019-05-18 RX ADMIN — Medication 20 MILLIGRAM(S): at 05:27

## 2019-05-18 RX ADMIN — Medication 400 MILLIGRAM(S): at 18:21

## 2019-05-18 NOTE — CONSULT NOTE ADULT - ATTENDING COMMENTS
Pt seen and examined, agree with above, post op bleding, HD stable  Hold A/C  Serial H/H   Serial abdominal exam1  1unit PRBC running now  IR intervention   Will evaluate after IR procedure

## 2019-05-18 NOTE — PROGRESS NOTE ADULT - SUBJECTIVE AND OBJECTIVE BOX
Surgery Progress Note    S:     Patient received 2 units RBC yesterday for Hct 22.1, poor response (Hct 23.8), given an additional unit overnight. Will recheck CBC this afternoon.    Patient seen and examined. No acute events overnight. Feeling well this morning, pain controlled, tolerating diet. Ambulating and voiding without difficulty.     O:    Vital Signs Last 24 Hrs  T(C): 36.3 (18 May 2019 10:15), Max: 37.3 (17 May 2019 18:50)  T(F): 97.3 (18 May 2019 10:15), Max: 99.1 (17 May 2019 18:50)  HR: 116 (18 May 2019 10:15) (100 - 129)  BP: 146/90 (18 May 2019 10:15) (129/77 - 156/77)  BP(mean): --  RR: 18 (18 May 2019 10:15) (18 - 20)  SpO2: 95% (18 May 2019 10:15) (93% - 95%)    Physical Exam  Constitutional: AAOx3, NAD  Respiratory: Nonlabored breathing.  Gastrointestinal: soft, mildly distended, NTTP, No rebound or guarding. Midline dressing intact with mild strikethrough. Mild ecchymosis surrounding incision. Some induration surrounding midline wound c/w residual blood from prior incisional hematoma, DEREK with SS output.    I&O's Detail    17 May 2019 07:01  -  18 May 2019 07:00  --------------------------------------------------------  IN:    dextrose 5% + sodium chloride 0.45% with potassium chloride 20 mEq/L: 750 mL    IV PiggyBack: 100 mL  Total IN: 850 mL    OUT:    Bulb: 47.5 mL    Stool: 1 mL    Voided: 1750 mL  Total OUT: 1798.5 mL    Total NET: -948.5 mL      18 May 2019 07:01  -  18 May 2019 10:46  --------------------------------------------------------  IN:  Total IN: 0 mL    OUT:    Voided: 100 mL  Total OUT: 100 mL    Total NET: -100 mL          MEDICATIONS  (STANDING):  benzocaine 15 mG/menthol 3.6 mG Lozenge 1 Lozenge Oral four times a day  dextrose 5% + sodium chloride 0.45% with potassium chloride 20 mEq/L 1000 milliLiter(s) (75 mL/Hr) IV Continuous <Continuous>  enoxaparin Injectable 40 milliGRAM(s) SubCutaneous daily  losartan 50 milliGRAM(s) Oral daily  metoprolol tartrate Injectable 5 milliGRAM(s) IV Push every 6 hours  NIFEdipine IR 20 milliGRAM(s) Oral three times a day  pantoprazole  Injectable 40 milliGRAM(s) IV Push daily  piperacillin/tazobactam IVPB. 3.375 Gram(s) IV Intermittent every 8 hours    MEDICATIONS  (PRN):  HYDROmorphone  Injectable 0.5 milliGRAM(s) IV Push every 3 hours PRN Severe Pain (7 - 10)  HYDROmorphone  Injectable 0.25 milliGRAM(s) IV Push every 3 hours PRN Moderate Pain (4 - 6)  LORazepam   Injectable 0.25 milliGRAM(s) IV Push daily PRN Anxiety      Labs:                          8.1    11.29 )-----------( 301      ( 18 May 2019 01:40 )             23.8       05-18    137  |  100  |  17  ----------------------------<  196<H>  3.2<L>   |  26  |  0.96    Ca    8.3<L>      18 May 2019 01:40  Phos  2.2     05-18  Mg     2.0     05-18        Radiology:

## 2019-05-18 NOTE — CONSULT NOTE ADULT - SUBJECTIVE AND OBJECTIVE BOX
SICU Consult  Consulting surgical team: SICU  Consulting attending: Dr. Coleman    HPI: 63M Hx Htn, Ventral hernia repair '04, inguinal hernia repair ~90's presents with ventral hernia x2 years, which has  been enlarging over time.  Pt denies abdominal pain.  Scheduled for Ventral hernia repair 5/13/19.   5/13: Patient underwent celiotomy, takedown of enterocutaneous fistula, small bowel resection with anatomical side to side, functional end to end anastomosis, incisional hernia repair with Phasix mesh (onlay), removal of foreign body (previous mesh).  POD 1 patient developed bleeding and had RTOR for re-celiotomy and evacuation of hematoma. Active bleeding found and ligated.  Patient recovering on floors but H/H downtrended. He was transfused 3 units of pRBC over the past 36 hours. He went for CTA which showed a hematoma and active extravasation.    Patient taken to IR.     PAST MEDICAL HISTORY:  Ventral hernia  Childhood asthma  Obesity  Hypertension      PAST SURGICAL HISTORY:  H/O ventral hernia repair  H/O inguinal hernia repair      MEDICATIONS:  dextrose 5% + sodium chloride 0.45% with potassium chloride 20 mEq/L 1000 milliLiter(s) IV Continuous <Continuous>  enoxaparin Injectable 40 milliGRAM(s) SubCutaneous daily  LORazepam   Injectable 0.25 milliGRAM(s) IV Push daily PRN  losartan 50 milliGRAM(s) Oral daily  metoprolol tartrate Injectable 5 milliGRAM(s) IV Push every 6 hours  NIFEdipine IR 20 milliGRAM(s) Oral three times a day  oxyCODONE    IR 5 milliGRAM(s) Oral every 4 hours PRN  oxyCODONE    IR 10 milliGRAM(s) Oral every 4 hours PRN  pantoprazole  Injectable 40 milliGRAM(s) IV Push daily  piperacillin/tazobactam IVPB. 3.375 Gram(s) IV Intermittent every 8 hours      ALLERGIES:  No Known Allergies      VITALS & I/Os:  Vital Signs Last 24 Hrs  T(C): 36.7 (18 May 2019 17:43), Max: 37.2 (18 May 2019 05:38)  T(F): 98 (18 May 2019 17:43), Max: 99 (18 May 2019 14:27)  HR: 99 (18 May 2019 18:35) (98 - 116)  BP: 148/84 (18 May 2019 18:35) (132/84 - 158/88)  BP(mean): --  RR: 19 (18 May 2019 17:43) (18 - 20)  SpO2: 94% (18 May 2019 17:43) (93% - 96%)    I&O's Summary    17 May 2019 07:01  -  18 May 2019 07:00  --------------------------------------------------------  IN: 850 mL / OUT: 1798.5 mL / NET: -948.5 mL    18 May 2019 07:01  -  18 May 2019 20:46  --------------------------------------------------------  IN: 850 mL / OUT: 1103.5 mL / NET: -253.5 mL        PHYSICAL EXAM:  General: No acute distress  Respiratory: Nonlabored  Cardiovascular: RRR  Abdominal: Soft, abdominal binder in place, distended, incision dressing with minimal spotting at inferior aspect. DEREK with minimal old clot. Lower midline bruising present.   Extremities: Warm    LABS:                        8.5    13.47 )-----------( 301      ( 18 May 2019 19:42 )             24.1     05-18    140  |  103  |  15  ----------------------------<  165<H>  4.1   |  28  |  0.96    Ca    8.5      18 May 2019 13:16  Phos  2.6     05-18  Mg     1.9     05-18      Lactate:    PT/INR - ( 18 May 2019 19:42 )   PT: 14.3 SEC;   INR: 1.28          PTT - ( 18 May 2019 19:42 )  PTT:33.2 SEC              IMAGING:

## 2019-05-18 NOTE — PROGRESS NOTE ADULT - SUBJECTIVE AND OBJECTIVE BOX
Vascular & Interventional Radiology Post-Procedure Note    Pre-Procedure Diagnosis: Abdominal Wall hematoma  Post-Procedure Diagnosis: Same as pre.  Indications for Procedure: Abdominal wall hematoma with focus of active contrast extravasation.    Operators: Dr. Bailey, Dr. Avila    Procedure Details/Findings:  Angiography reveals focus of active bleeding originating from a branch of the left inferior epigastric artery. Both inferior epigastric arteries were embolized utilizing combination of gelfoam slurry and microcoils.    Access (if applicable): Right groin, 5 Fr    Complications: None  Estimated Blood Loss: 5cc  Contrast: 80cc  Sedation: IV sedation by Anesthesiology  Patient Condition/Disposition: PACU    Plan:  - SICU evaluation  - Trend CBC  - Monitor vitals  - Bedrest 4 hours with right lower extremity extended. Monitor for signs of hematoma. Vascular & Interventional Radiology Post-Procedure Note    Pre-Procedure Diagnosis: Abdominal Wall hematoma  Post-Procedure Diagnosis: Same as pre.  Indications for Procedure: Abdominal wall hematoma with focus of active contrast extravasation.    Operators: Dr. Bailey, Dr. Avila    Procedure Details/Findings:  Angiography reveals foci of active bleeding originating from a branch of the left inferior epigastric artery and a branch of the right epigastric artery. Both inferior epigastric arteries were embolized utilizing combination of gelfoam slurry and microcoils.    Access (if applicable): Right groin, 5 Fr    Complications: None  Estimated Blood Loss: 5cc  Contrast: 80cc  Sedation: IV sedation by Anesthesiology  Patient Condition/Disposition: PACU    Plan:  - SICU evaluation  - Trend CBC  - Monitor vitals  - Bedrest 4 hours with right lower extremity extended. Monitor for signs of hematoma.

## 2019-05-18 NOTE — CONSULT NOTE ADULT - ASSESSMENT
63M Hx htn, Ventral hernia repair presented for elective hernia repair 5/13. Patient RTOR 5/14 for hematoma evacuation and ligation of bleeding vessel. Patient continued to have downtrended H/h and found to have active mesenteric extravasation 5/18.       NEURO  - LORazepam   Injectable 0.25 milliGRAM(s) daily PRN  - oxyCODONE    IR 5 milliGRAM(s) every 4 hours PRN  - oxyCODONE    IR 10 milliGRAM(s) every 4 hours PRN    RESPIRATORY  -Satting 98% on NC  - Chest PT  - IS 10x/h  - OOB     CARDIOVASCULAR  - losartan 50 milliGRAM(s) daily  - metoprolol tartrate Injectable 5 milliGRAM(s) every 6 hours  - NIFEdipine IR 20 milliGRAM(s) three times a day  - IV fluids    GI/NUTRITION  - NPO  - pantoprazole  Injectable 40 milliGRAM(s) daily    GENITOURINARY/RENAL  - Consider paulino for Strict urine outputs    HEMATOLOGIC  -  Hold DVT ppx    INFECTIOUS DISEASES  - piperacillin/tazobactam IVPB. 3.375 Gram(s) every 8 hours  - Consider stopping antibiotics    ENDOCRINE  - Routine concerns

## 2019-05-19 LAB
ANION GAP SERPL CALC-SCNC: 12 MMO/L — SIGNIFICANT CHANGE UP (ref 7–14)
BUN SERPL-MCNC: 16 MG/DL — SIGNIFICANT CHANGE UP (ref 7–23)
CALCIUM SERPL-MCNC: 8.4 MG/DL — SIGNIFICANT CHANGE UP (ref 8.4–10.5)
CHLORIDE SERPL-SCNC: 103 MMOL/L — SIGNIFICANT CHANGE UP (ref 98–107)
CO2 SERPL-SCNC: 25 MMOL/L — SIGNIFICANT CHANGE UP (ref 22–31)
CREAT SERPL-MCNC: 1 MG/DL — SIGNIFICANT CHANGE UP (ref 0.5–1.3)
GLUCOSE SERPL-MCNC: 179 MG/DL — HIGH (ref 70–99)
HAPTOGLOB SERPL-MCNC: 195 MG/DL — SIGNIFICANT CHANGE UP (ref 34–200)
HCT VFR BLD CALC: 23.3 % — LOW (ref 39–50)
HCT VFR BLD CALC: 24.5 % — LOW (ref 39–50)
HCT VFR BLD CALC: 27.5 % — LOW (ref 39–50)
HGB BLD-MCNC: 8.1 G/DL — LOW (ref 13–17)
HGB BLD-MCNC: 8.2 G/DL — LOW (ref 13–17)
HGB BLD-MCNC: 9.5 G/DL — LOW (ref 13–17)
MAGNESIUM SERPL-MCNC: 1.8 MG/DL — SIGNIFICANT CHANGE UP (ref 1.6–2.6)
MCHC RBC-ENTMCNC: 28.6 PG — SIGNIFICANT CHANGE UP (ref 27–34)
MCHC RBC-ENTMCNC: 29.4 PG — SIGNIFICANT CHANGE UP (ref 27–34)
MCHC RBC-ENTMCNC: 30.1 PG — SIGNIFICANT CHANGE UP (ref 27–34)
MCHC RBC-ENTMCNC: 33.1 % — SIGNIFICANT CHANGE UP (ref 32–36)
MCHC RBC-ENTMCNC: 34.5 % — SIGNIFICANT CHANGE UP (ref 32–36)
MCHC RBC-ENTMCNC: 35.2 % — SIGNIFICANT CHANGE UP (ref 32–36)
MCV RBC AUTO: 85.1 FL — SIGNIFICANT CHANGE UP (ref 80–100)
MCV RBC AUTO: 85.7 FL — SIGNIFICANT CHANGE UP (ref 80–100)
MCV RBC AUTO: 86.6 FL — SIGNIFICANT CHANGE UP (ref 80–100)
NRBC # FLD: 0 K/UL — SIGNIFICANT CHANGE UP (ref 0–0)
NRBC # FLD: 0 K/UL — SIGNIFICANT CHANGE UP (ref 0–0)
NRBC # FLD: 0.03 K/UL — SIGNIFICANT CHANGE UP (ref 0–0)
PHOSPHATE SERPL-MCNC: 3.6 MG/DL — SIGNIFICANT CHANGE UP (ref 2.5–4.5)
PLATELET # BLD AUTO: 299 K/UL — SIGNIFICANT CHANGE UP (ref 150–400)
PLATELET # BLD AUTO: 320 K/UL — SIGNIFICANT CHANGE UP (ref 150–400)
PLATELET # BLD AUTO: 339 K/UL — SIGNIFICANT CHANGE UP (ref 150–400)
PMV BLD: 8.6 FL — SIGNIFICANT CHANGE UP (ref 7–13)
PMV BLD: 8.8 FL — SIGNIFICANT CHANGE UP (ref 7–13)
PMV BLD: 8.9 FL — SIGNIFICANT CHANGE UP (ref 7–13)
POTASSIUM SERPL-MCNC: 4 MMOL/L — SIGNIFICANT CHANGE UP (ref 3.5–5.3)
POTASSIUM SERPL-SCNC: 4 MMOL/L — SIGNIFICANT CHANGE UP (ref 3.5–5.3)
RBC # BLD: 2.72 M/UL — LOW (ref 4.2–5.8)
RBC # BLD: 2.83 M/UL — LOW (ref 4.2–5.8)
RBC # BLD: 3.23 M/UL — LOW (ref 4.2–5.8)
RBC # FLD: 14.1 % — SIGNIFICANT CHANGE UP (ref 10.3–14.5)
RBC # FLD: 14.2 % — SIGNIFICANT CHANGE UP (ref 10.3–14.5)
RBC # FLD: 14.4 % — SIGNIFICANT CHANGE UP (ref 10.3–14.5)
RETICS #: 101 K/UL — SIGNIFICANT CHANGE UP (ref 25–125)
RETICS/RBC NFR: 3.6 % — HIGH (ref 0.5–2.5)
SODIUM SERPL-SCNC: 140 MMOL/L — SIGNIFICANT CHANGE UP (ref 135–145)
WBC # BLD: 10.41 K/UL — SIGNIFICANT CHANGE UP (ref 3.8–10.5)
WBC # BLD: 10.45 K/UL — SIGNIFICANT CHANGE UP (ref 3.8–10.5)
WBC # BLD: 11.58 K/UL — HIGH (ref 3.8–10.5)
WBC # FLD AUTO: 10.41 K/UL — SIGNIFICANT CHANGE UP (ref 3.8–10.5)
WBC # FLD AUTO: 10.45 K/UL — SIGNIFICANT CHANGE UP (ref 3.8–10.5)
WBC # FLD AUTO: 11.58 K/UL — HIGH (ref 3.8–10.5)

## 2019-05-19 PROCEDURE — 74018 RADEX ABDOMEN 1 VIEW: CPT | Mod: 26

## 2019-05-19 PROCEDURE — 93010 ELECTROCARDIOGRAM REPORT: CPT

## 2019-05-19 PROCEDURE — 71045 X-RAY EXAM CHEST 1 VIEW: CPT | Mod: 26

## 2019-05-19 RX ORDER — FUROSEMIDE 40 MG
20 TABLET ORAL ONCE
Refills: 0 | Status: COMPLETED | OUTPATIENT
Start: 2019-05-19 | End: 2019-05-19

## 2019-05-19 RX ORDER — ONDANSETRON 8 MG/1
4 TABLET, FILM COATED ORAL ONCE
Refills: 0 | Status: DISCONTINUED | OUTPATIENT
Start: 2019-05-19 | End: 2019-05-19

## 2019-05-19 RX ORDER — ACETAMINOPHEN 500 MG
1000 TABLET ORAL ONCE
Refills: 0 | Status: COMPLETED | OUTPATIENT
Start: 2019-05-19 | End: 2019-05-19

## 2019-05-19 RX ORDER — HYDROMORPHONE HYDROCHLORIDE 2 MG/ML
0.5 INJECTION INTRAMUSCULAR; INTRAVENOUS; SUBCUTANEOUS
Refills: 0 | Status: DISCONTINUED | OUTPATIENT
Start: 2019-05-19 | End: 2019-05-19

## 2019-05-19 RX ORDER — LIDOCAINE 4 G/100G
1 CREAM TOPICAL ONCE
Refills: 0 | Status: COMPLETED | OUTPATIENT
Start: 2019-05-19 | End: 2019-05-19

## 2019-05-19 RX ADMIN — Medication 1000 MILLIGRAM(S): at 23:24

## 2019-05-19 RX ADMIN — Medication 20 MILLIGRAM(S): at 05:20

## 2019-05-19 RX ADMIN — Medication 400 MILLIGRAM(S): at 14:41

## 2019-05-19 RX ADMIN — DEXTROSE MONOHYDRATE, SODIUM CHLORIDE, AND POTASSIUM CHLORIDE 125 MILLILITER(S): 50; .745; 4.5 INJECTION, SOLUTION INTRAVENOUS at 14:44

## 2019-05-19 RX ADMIN — PIPERACILLIN AND TAZOBACTAM 25 GRAM(S): 4; .5 INJECTION, POWDER, LYOPHILIZED, FOR SOLUTION INTRAVENOUS at 00:51

## 2019-05-19 RX ADMIN — Medication 20 MILLIGRAM(S): at 14:41

## 2019-05-19 RX ADMIN — Medication 20 MILLIGRAM(S): at 07:33

## 2019-05-19 RX ADMIN — Medication 1000 MILLIGRAM(S): at 15:40

## 2019-05-19 RX ADMIN — Medication 5 MILLIGRAM(S): at 05:21

## 2019-05-19 RX ADMIN — LIDOCAINE 1 PATCH: 4 CREAM TOPICAL at 22:53

## 2019-05-19 RX ADMIN — DEXTROSE MONOHYDRATE, SODIUM CHLORIDE, AND POTASSIUM CHLORIDE 125 MILLILITER(S): 50; .745; 4.5 INJECTION, SOLUTION INTRAVENOUS at 00:10

## 2019-05-19 RX ADMIN — PIPERACILLIN AND TAZOBACTAM 25 GRAM(S): 4; .5 INJECTION, POWDER, LYOPHILIZED, FOR SOLUTION INTRAVENOUS at 09:13

## 2019-05-19 RX ADMIN — HYDROMORPHONE HYDROCHLORIDE 0.5 MILLIGRAM(S): 2 INJECTION INTRAMUSCULAR; INTRAVENOUS; SUBCUTANEOUS at 00:50

## 2019-05-19 RX ADMIN — Medication 1000 MILLIGRAM(S): at 07:30

## 2019-05-19 RX ADMIN — Medication 5 MILLIGRAM(S): at 11:18

## 2019-05-19 RX ADMIN — Medication 5 MILLIGRAM(S): at 17:43

## 2019-05-19 RX ADMIN — Medication 20 MILLIGRAM(S): at 21:09

## 2019-05-19 RX ADMIN — PANTOPRAZOLE SODIUM 40 MILLIGRAM(S): 20 TABLET, DELAYED RELEASE ORAL at 09:13

## 2019-05-19 RX ADMIN — PIPERACILLIN AND TAZOBACTAM 25 GRAM(S): 4; .5 INJECTION, POWDER, LYOPHILIZED, FOR SOLUTION INTRAVENOUS at 17:43

## 2019-05-19 RX ADMIN — Medication 1000 MILLIGRAM(S): at 00:45

## 2019-05-19 RX ADMIN — HYDROMORPHONE HYDROCHLORIDE 0.5 MILLIGRAM(S): 2 INJECTION INTRAMUSCULAR; INTRAVENOUS; SUBCUTANEOUS at 01:10

## 2019-05-19 RX ADMIN — Medication 400 MILLIGRAM(S): at 00:23

## 2019-05-19 RX ADMIN — Medication 400 MILLIGRAM(S): at 22:54

## 2019-05-19 RX ADMIN — Medication 5 MILLIGRAM(S): at 00:41

## 2019-05-19 RX ADMIN — LOSARTAN POTASSIUM 50 MILLIGRAM(S): 100 TABLET, FILM COATED ORAL at 14:41

## 2019-05-19 RX ADMIN — Medication 400 MILLIGRAM(S): at 07:14

## 2019-05-19 RX ADMIN — HYDROMORPHONE HYDROCHLORIDE 0.5 MILLIGRAM(S): 2 INJECTION INTRAMUSCULAR; INTRAVENOUS; SUBCUTANEOUS at 03:04

## 2019-05-19 NOTE — CHART NOTE - NSCHARTNOTEFT_GEN_A_CORE
SICU Follow up Note  =====================================================  Interval/Overnight Events: Patient assessed at bedside for follow up after being short of breath this AM.  He appears comfortable on nasal cannula.  O2 saturations noted to be 92-96%.      HPI:    Patient is a 63 year old male with a PMHx of HTN, ventral hernia repair (2004) amd inguinal hernia repair (1990s) who presented with ventral hernia x2 years, which has  been enlarging over time.  Scheduled for ventral hernia repair 5/13/19.       PAST MEDICAL & SURGICAL HISTORY:  Ventral hernia  Childhood asthma  Obesity  Hypertension  H/O ventral hernia repair: 2004  H/O inguinal hernia repair: &gt;20 years ago      ALLERGIES:  No Known Allergies      --------------------------------------------------------------------------------------    MEDICATIONS:     Neurologic Medications  acetaminophen  IVPB .. 1000 milliGRAM(s) IV Intermittent once  LORazepam   Injectable 0.25 milliGRAM(s) IV Push daily PRN Anxiety  oxyCODONE    IR 5 milliGRAM(s) Oral every 4 hours PRN Moderate Pain (4 - 6)  oxyCODONE    IR 10 milliGRAM(s) Oral every 4 hours PRN Severe Pain (7 - 10)    Cardiovascular Medications  losartan 50 milliGRAM(s) Oral daily  metoprolol tartrate Injectable 5 milliGRAM(s) IV Push every 6 hours  NIFEdipine IR 20 milliGRAM(s) Oral three times a day    Gastrointestinal Medications  dextrose 5% + sodium chloride 0.45% with potassium chloride 20 mEq/L 1000 milliLiter(s) IV Continuous <Continuous>  pantoprazole  Injectable 40 milliGRAM(s) IV Push daily    Antimicrobial/Immunologic Medications  piperacillin/tazobactam IVPB. 3.375 Gram(s) IV Intermittent every 8 hours    Topical/Other Medications  lidocaine   Patch 1 Patch Transdermal once    --------------------------------------------------------------------------------------    VITAL SIGNS:  ICU Vital Signs Last 24 Hrs  T(C): 36.7 (19 May 2019 17:40), Max: 37.2 (19 May 2019 05:24)  T(F): 98 (19 May 2019 17:40), Max: 98.9 (19 May 2019 05:24)  HR: 88 (19 May 2019 17:50) (82 - 106)  BP: 140/80 (19 May 2019 17:50) (133/77 - 163/94)  BP(mean): 93 (19 May 2019 04:00) (87 - 107)  RR: 20 (19 May 2019 17:50) (17 - 26)  SpO2: 94% (19 May 2019 17:50) (90% - 100%)    --------------------------------------------------------------------------------------    INS AND OUTS:  18 May 2019 07:01  -  19 May 2019 07:00  --------------------------------------------------------  IN:    dextrose 5% + sodium chloride 0.45% with potassium chloride 20 mEq/L: 1375 mL    dextrose 5% + sodium chloride 0.45% with potassium chloride 20 mEq/L: 600 mL    IV PiggyBack: 200 mL  Total IN: 2175 mL    OUT:    Bulb: 10 mL    Stool: 1 mL    Voided: 1400 mL  Total OUT: 1411 mL    Total NET: 764 mL      19 May 2019 07:01  -  19 May 2019 21:03  --------------------------------------------------------  IN:    dextrose 5% + sodium chloride 0.45% with potassium chloride 20 mEq/L: 1500 mL    IV PiggyBack: 100 mL    Packed Red Blood Cells: 300 mL  Total IN: 1900 mL    OUT:    Bulb: 2.5 mL    Stool: 2 mL    Voided: 1000 mL  Total OUT: 1004.5 mL    Total NET: 895.5 mL    --------------------------------------------------------------------------------------    EXAM  NEUROLOGY  Exam: Normal.  In no acute distress.  Alert and oriented x4.  No focal neurologic deficits.    HEENT  Exam: Normocephalic, atraumatic.    RESPIRATORY  Exam: Nasal cannula in place. Lungs clear to auscultation, Normal expansion / effort.     CARDIOVASCULAR  Exam: S1, S2.  Regular rate and rhythm.     GI/NUTRITION  Exam: Abdomen soft, non-tender.  Distended.      VASCULAR  Exam: Extremities warm, pink, well-perfused.    MUSCULOSKELETAL  Exam: All extremities moving spontaneously without limitations.    SKIN  Exam: Good skin turgor, no skin breakdown.    METABOLIC / FLUIDS / ELECTROLYTES  dextrose 5% + sodium chloride 0.45% with potassium chloride 20 mEq/L 1000 milliLiter(s) IV Continuous <Continuous>    INFECTIOUS DISEASE  Antimicrobials/Immunologic Medications:  piperacillin/tazobactam IVPB. 3.375 Gram(s) IV Intermittent every 8 hours    --------------------------------------------------------------------------------------    LABS    CBC:                      9.5    10.41 )-----------( 320      ( 19 May 2019 14:20 )             27.5     CHEM:  140  |  103  |  16  ----------------------------<  179<H>  4.0   |  25  |  1.00    Ca    8.4      19 May 2019 05:40  Phos  3.6     05-19  Mg     1.8     05-19      --------------------------------------------------------------------------------------      ASSESSMENT:   Patient is a 63 year old male with a PMHx of HTN, ventral hernia repair (2004) amd inguinal hernia repair (1990s) who presented with ventral hernia x2 years, which has  been enlarging over time.  S/P elective ventral hernia repair 5/13/19. Patient returned to OR 5/14/10 for hematoma evacuation and ligation of bleeding vessel.  Patient continued to have downtrended hemoglobin and hematocrit and found to have active mesenteric extravasation.  Now S/P embolization of inferior epigastric arteries.       PLAN:    NEUROLOGY:  - Continue with pain control as needed    RESPIRATORY:  - Supplemental O2 as needed  - Pulmonary toilet  - Incentive spirometer  - Maintain O2 saturation >92%    CARDIOVASCULAR:  - Maintain MAP >65  - Continue with anti-hypertensives  - Continue to monitor heart rate    GI / NUTRITION:  - Keep NPO for now     HEMATOLOGY:  - S/P 1 unit PRBC today   - Hemoglobin and hematocrit 9.5 / 27.5  - Continue to monitor CBC and transfuse PRN    INFECTIOUS DISEASE:  - Currently afebrile with no leukocytosis  - Continue with IV Zosyn     RENAL / GENITOURINARY:  - S/P Lasix today with good response  - Continue to monitor electrolytes and replete PRN      Will continue to follow  [facility]    #[id number] / #[id number]  Surgical ICU

## 2019-05-19 NOTE — PROGRESS NOTE ADULT - SUBJECTIVE AND OBJECTIVE BOX
HPI: 63M Hx Htn, Ventral hernia repair '04, inguinal hernia repair ~90's presents with ventral hernia x2 years, which has  been enlarging over time.  Pt denies abdominal pain.  Scheduled for Ventral hernia repair 5/13/19.   5/13: Patient underwent celiotomy, takedown of enterocutaneous fistula, small bowel resection with anatomical side to side, functional end to end anastomosis, incisional hernia repair with Phasix mesh (onlay), removal of foreign body (previous mesh).  POD 1 patient developed bleeding and had RTOR for re-celiotomy and evacuation of hematoma. Active bleeding found and ligated.  Patient recovering on floors but H/H downtrended. He was transfused 3 units of pRBC over the past 36 hours. He went for CTA which showed a hematoma and active extravasation.    Patient taken to IR. Inferior epigastric found to have bleeding epigastric which has been coil embolized.     Vital Signs Last 24 Hrs  T(C): 36.7 (19 May 2019 00:00), Max: 37.2 (18 May 2019 05:38)  T(F): 98.1 (19 May 2019 00:00), Max: 99 (18 May 2019 14:27)  HR: 93 (19 May 2019 00:20) (93 - 116)  BP: 151/85 (19 May 2019 00:20) (132/84 - 158/88)  BP(mean): 101 (19 May 2019 00:20) (95 - 101)  RR: 23 (19 May 2019 00:20) (17 - 25)  SpO2: 97% (19 May 2019 00:20) (93% - 97%)      PHYSICAL EXAM:  General: No acute distress  Respiratory: Nonlabored  Cardiovascular: RRR  Abdominal: Soft, abdominal binder in place, distended, incision dressing with minimal spotting at inferior aspect. DEREK with minimal old clot. Lower midline bruising present.   Extremities: Warm    LABS:                        8.5    13.47 )-----------( 301      ( 18 May 2019 19:42 )             24.1     05-18    140  |  103  |  15  ----------------------------<  165<H>  4.1   |  28  |  0.96    Ca    8.5      18 May 2019 13:16  Phos  2.6     05-18  Mg     1.9     05-18      PT/INR - ( 18 May 2019 19:42 )   PT: 14.3 SEC;   INR: 1.28          PTT - ( 18 May 2019 19:42 )  PTT:33.2 SEC      INs and OUTs:    05-17-19 @ 07:01  -  05-18-19 @ 07:00  --------------------------------------------------------  IN: 850 mL / OUT: 1798.5 mL / NET: -948.5 mL    05-18-19 @ 07:01  -  05-19-19 @ 00:32  --------------------------------------------------------  IN: 850 mL / OUT: 1103.5 mL / NET: -253.5 mL

## 2019-05-19 NOTE — PROGRESS NOTE ADULT - SUBJECTIVE AND OBJECTIVE BOX
63y Male  Post operative day number 6/5 sp IR embolization    T(C): 36.6 (05-19-19 @ 06:39), Max: 37.2 (05-18-19 @ 14:27)  HR: 95 (05-19-19 @ 06:39) (82 - 116)  BP: 157/80 (05-19-19 @ 06:39) (133/77 - 159/77)  RR: 22 (05-19-19 @ 06:39) (17 - 26)  SpO2: 90% (05-19-19 @ 06:39) (90% - 100%)  Wt(kg): --    05-18 @ 07:01  -  05-19 @ 07:00  --------------------------------------------------------  IN: 2175 mL / OUT: 1411 mL / NET: 764 mL                            8.1    10.45 )-----------( 299      ( 19 May 2019 05:40 )             24.5     05-19    140  |  103  |  16  ----------------------------<  179<H>  4.0   |  25  |  1.00    Ca    8.4      19 May 2019 05:40  Phos  3.6     05-19  Mg     1.8     05-19        PT/INR - ( 18 May 2019 19:42 )   PT: 14.3 SEC;   INR: 1.28          PTT - ( 18 May 2019 19:42 )  PTT:33.2 SEC  acetaminophen  IVPB .. 1000 milliGRAM(s) IV Intermittent once  dextrose 5% + sodium chloride 0.45% with potassium chloride 20 mEq/L 1000 milliLiter(s) IV Continuous <Continuous>  LORazepam   Injectable 0.25 milliGRAM(s) IV Push daily PRN  losartan 50 milliGRAM(s) Oral daily  metoprolol tartrate Injectable 5 milliGRAM(s) IV Push every 6 hours  NIFEdipine IR 20 milliGRAM(s) Oral three times a day  oxyCODONE    IR 5 milliGRAM(s) Oral every 4 hours PRN  oxyCODONE    IR 10 milliGRAM(s) Oral every 4 hours PRN  pantoprazole  Injectable 40 milliGRAM(s) IV Push daily  piperacillin/tazobactam IVPB. 3.375 Gram(s) IV Intermittent every 8 hours    Physical exam  abd mild distension   no peritoneal signs  improved from last night at [facility]  echymosis in flank and scrotum dependent    Plan   Transfuse 1 unit  keep NPO except ice chips  pt had BM this am and response to lasix and feels better  OOB to chair today  monitor saturation   all x-ray reviewed       Gerardo Perez MD FACS [facility]  [phone] office

## 2019-05-19 NOTE — PROGRESS NOTE ADULT - SUBJECTIVE AND OBJECTIVE BOX
HPI: 63M Hx Htn, Ventral hernia repair '04, inguinal hernia repair ~90's presents with ventral hernia x2 years, which has  been enlarging over time.  Pt denies abdominal pain.  Scheduled for Ventral hernia repair 5/13/19.   5/13: Patient underwent celiotomy, takedown of enterocutaneous fistula, small bowel resection with anatomical side to side, functional end to end anastomosis, incisional hernia repair with Phasix mesh (onlay), removal of foreign body (previous mesh).  POD 1 patient developed bleeding and had RTOR for re-celiotomy and evacuation of hematoma. Active bleeding found and ligated.  Patient recovering on floors but H/H downtrended. He was transfused 3 units of pRBC over the past 36 hours. He went for CTA which showed a hematoma and active extravasation.    Patient taken to IR. Inferior epigastric found to have bleeding epigastric which has been coil embolized.     INTERVAL OVERNIGHT EVENTS: This am, patient had difficulty breathing and received 20mg of Lasix.  Patient seen and examined at bedside. Patient c/o back pain, minimal abdominal pain.  He was placed on 3L nasal canula prior to me examining him due to desat to 88%.  Sats 91-92. Prior to this, he was saturating 93-95% on RA. He had 2 BMs and + flatus today.  CXR performed this am was clear.    MEDICATIONS  (STANDING):  dextrose 5% + sodium chloride 0.45% with potassium chloride 20 mEq/L 1000 milliLiter(s) (125 mL/Hr) IV Continuous <Continuous>  lidocaine   Patch 1 Patch Transdermal once  losartan 50 milliGRAM(s) Oral daily  metoprolol tartrate Injectable 5 milliGRAM(s) IV Push every 6 hours  NIFEdipine IR 20 milliGRAM(s) Oral three times a day  pantoprazole  Injectable 40 milliGRAM(s) IV Push daily  piperacillin/tazobactam IVPB. 3.375 Gram(s) IV Intermittent every 8 hours    MEDICATIONS  (PRN):  LORazepam   Injectable 0.25 milliGRAM(s) IV Push daily PRN Anxiety  oxyCODONE    IR 5 milliGRAM(s) Oral every 4 hours PRN Moderate Pain (4 - 6)  oxyCODONE    IR 10 milliGRAM(s) Oral every 4 hours PRN Severe Pain (7 - 10)      Vital Signs Last 24 Hrs  T(C): 36.6 (19 May 2019 14:35), Max: 37.2 (19 May 2019 05:24)  T(F): 97.8 (19 May 2019 14:35), Max: 98.9 (19 May 2019 05:24)  HR: 103 (19 May 2019 14:35) (82 - 108)  BP: 163/94 (19 May 2019 14:35) (133/77 - 163/94)  BP(mean): 93 (19 May 2019 04:00) (87 - 107)  RR: 20 (19 May 2019 14:35) (17 - 26)  SpO2: 93% (19 May 2019 14:35) (90% - 100%)    PHYSICAL EXAM:    Constitutional: Patient A/0 x 3 in some discomfort    HEENT: NCAT, PERRLA    Respiratory: decreased b/s at bases     Cardiovascular: s1, s2 RRR    Gastrointestinal: moderately distended, non tender to palpation    Extremities: Warm     I&O's Detail    18 May 2019 07:01  -  19 May 2019 07:00  --------------------------------------------------------  IN:    dextrose 5% + sodium chloride 0.45% with potassium chloride 20 mEq/L: 1375 mL    dextrose 5% + sodium chloride 0.45% with potassium chloride 20 mEq/L: 600 mL    IV PiggyBack: 200 mL  Total IN: 2175 mL    OUT:    Bulb: 10 mL    Stool: 1 mL    Voided: 1400 mL  Total OUT: 1411 mL    Total NET: 764 mL      19 May 2019 07:01  -  19 May 2019 16:40  --------------------------------------------------------  IN:    dextrose 5% + sodium chloride 0.45% with potassium chloride 20 mEq/L: 1000 mL    Packed Red Blood Cells: 300 mL  Total IN: 1300 mL    OUT:    Bulb: 2.5 mL    Stool: 2 mL    Voided: 1000 mL  Total OUT: 1004.5 mL    Total NET: 295.5 mL      LABS:                        9.5    10.41 )-----------( 320      ( 19 May 2019 14:20 )             27.5     05-19    140  |  103  |  16  ----------------------------<  179<H>  4.0   |  25  |  1.00    Ca    8.4      19 May 2019 05:40  Phos  3.6     05-19  Mg     1.8     05-19    PT/INR - ( 18 May 2019 19:42 )   PT: 14.3 SEC;   INR: 1.28        PTT - ( 18 May 2019 19:42 )  PTT:33.2 SEC    ASSESSMENT:   63M Hx htn, Ventral hernia repair presented for elective hernia repair 5/13. Patient RTOR 5/14 for hematoma evacuation and ligation of bleeding vessel. Patient continued to have downtrended H/h and found to have active mesenteric extravasation 5/18, s/p embolization of inferior epigastric arteries.     PLAN:  1) NEURO: Pain control as needed  2) Resp: Supplemental O2 as needed, Pulmonary toilet, OOB-Chair. Patient having difficulty taking deep breaths due to abdominal distention.  3) Cardio: Continue to monitor HR, BP stable  4) GI: Keep NPO for now  5) Heme: Repeat CBC responded well to 1 Unit PRBC today. Check CBC in am  6) ID: continue Zosyn   7) Renal: Patient received Lasix today, good response. Check BMP in the am.  8) Will continue to follow.    [facility]

## 2019-05-20 LAB
ANION GAP SERPL CALC-SCNC: 13 MMO/L — SIGNIFICANT CHANGE UP (ref 7–14)
BUN SERPL-MCNC: 14 MG/DL — SIGNIFICANT CHANGE UP (ref 7–23)
CALCIUM SERPL-MCNC: 8.5 MG/DL — SIGNIFICANT CHANGE UP (ref 8.4–10.5)
CHLORIDE SERPL-SCNC: 102 MMOL/L — SIGNIFICANT CHANGE UP (ref 98–107)
CO2 SERPL-SCNC: 24 MMOL/L — SIGNIFICANT CHANGE UP (ref 22–31)
CREAT SERPL-MCNC: 0.98 MG/DL — SIGNIFICANT CHANGE UP (ref 0.5–1.3)
GLUCOSE SERPL-MCNC: 159 MG/DL — HIGH (ref 70–99)
HCT VFR BLD CALC: 25.8 % — LOW (ref 39–50)
HCT VFR BLD CALC: 26.1 % — LOW (ref 39–50)
HGB BLD-MCNC: 8.7 G/DL — LOW (ref 13–17)
HGB BLD-MCNC: 8.8 G/DL — LOW (ref 13–17)
MAGNESIUM SERPL-MCNC: 1.8 MG/DL — SIGNIFICANT CHANGE UP (ref 1.6–2.6)
MCHC RBC-ENTMCNC: 29.1 PG — SIGNIFICANT CHANGE UP (ref 27–34)
MCHC RBC-ENTMCNC: 29.3 PG — SIGNIFICANT CHANGE UP (ref 27–34)
MCHC RBC-ENTMCNC: 33.7 % — SIGNIFICANT CHANGE UP (ref 32–36)
MCHC RBC-ENTMCNC: 33.7 % — SIGNIFICANT CHANGE UP (ref 32–36)
MCV RBC AUTO: 86.3 FL — SIGNIFICANT CHANGE UP (ref 80–100)
MCV RBC AUTO: 87 FL — SIGNIFICANT CHANGE UP (ref 80–100)
NRBC # FLD: 0 K/UL — SIGNIFICANT CHANGE UP (ref 0–0)
NRBC # FLD: 0 K/UL — SIGNIFICANT CHANGE UP (ref 0–0)
PHOSPHATE SERPL-MCNC: 3.3 MG/DL — SIGNIFICANT CHANGE UP (ref 2.5–4.5)
PLATELET # BLD AUTO: 293 K/UL — SIGNIFICANT CHANGE UP (ref 150–400)
PLATELET # BLD AUTO: 311 K/UL — SIGNIFICANT CHANGE UP (ref 150–400)
PMV BLD: 8.5 FL — SIGNIFICANT CHANGE UP (ref 7–13)
PMV BLD: 8.7 FL — SIGNIFICANT CHANGE UP (ref 7–13)
POTASSIUM SERPL-MCNC: 3.5 MMOL/L — SIGNIFICANT CHANGE UP (ref 3.5–5.3)
POTASSIUM SERPL-SCNC: 3.5 MMOL/L — SIGNIFICANT CHANGE UP (ref 3.5–5.3)
RBC # BLD: 2.99 M/UL — LOW (ref 4.2–5.8)
RBC # BLD: 3 M/UL — LOW (ref 4.2–5.8)
RBC # FLD: 14.6 % — HIGH (ref 10.3–14.5)
RBC # FLD: 14.6 % — HIGH (ref 10.3–14.5)
SODIUM SERPL-SCNC: 139 MMOL/L — SIGNIFICANT CHANGE UP (ref 135–145)
WBC # BLD: 9.29 K/UL — SIGNIFICANT CHANGE UP (ref 3.8–10.5)
WBC # BLD: 9.58 K/UL — SIGNIFICANT CHANGE UP (ref 3.8–10.5)
WBC # FLD AUTO: 9.29 K/UL — SIGNIFICANT CHANGE UP (ref 3.8–10.5)
WBC # FLD AUTO: 9.58 K/UL — SIGNIFICANT CHANGE UP (ref 3.8–10.5)

## 2019-05-20 PROCEDURE — 93010 ELECTROCARDIOGRAM REPORT: CPT

## 2019-05-20 RX ORDER — METOPROLOL TARTRATE 50 MG
200 TABLET ORAL DAILY
Refills: 0 | Status: DISCONTINUED | OUTPATIENT
Start: 2019-05-20 | End: 2019-05-23

## 2019-05-20 RX ORDER — LIDOCAINE 4 G/100G
1 CREAM TOPICAL ONCE
Refills: 0 | Status: COMPLETED | OUTPATIENT
Start: 2019-05-20 | End: 2019-05-20

## 2019-05-20 RX ORDER — ACETAMINOPHEN 500 MG
650 TABLET ORAL EVERY 6 HOURS
Refills: 0 | Status: DISCONTINUED | OUTPATIENT
Start: 2019-05-20 | End: 2019-05-23

## 2019-05-20 RX ORDER — MAGNESIUM SULFATE 500 MG/ML
2 VIAL (ML) INJECTION ONCE
Refills: 0 | Status: COMPLETED | OUTPATIENT
Start: 2019-05-20 | End: 2019-05-20

## 2019-05-20 RX ORDER — METOPROLOL TARTRATE 50 MG
200 TABLET ORAL DAILY
Refills: 0 | Status: DISCONTINUED | OUTPATIENT
Start: 2019-05-20 | End: 2019-05-20

## 2019-05-20 RX ORDER — POTASSIUM CHLORIDE 20 MEQ
10 PACKET (EA) ORAL
Refills: 0 | Status: COMPLETED | OUTPATIENT
Start: 2019-05-20 | End: 2019-05-20

## 2019-05-20 RX ADMIN — Medication 650 MILLIGRAM(S): at 23:37

## 2019-05-20 RX ADMIN — Medication 50 GRAM(S): at 09:07

## 2019-05-20 RX ADMIN — OXYCODONE HYDROCHLORIDE 5 MILLIGRAM(S): 5 TABLET ORAL at 05:02

## 2019-05-20 RX ADMIN — Medication 20 MILLIGRAM(S): at 21:23

## 2019-05-20 RX ADMIN — DEXTROSE MONOHYDRATE, SODIUM CHLORIDE, AND POTASSIUM CHLORIDE 75 MILLILITER(S): 50; .745; 4.5 INJECTION, SOLUTION INTRAVENOUS at 09:07

## 2019-05-20 RX ADMIN — PIPERACILLIN AND TAZOBACTAM 25 GRAM(S): 4; .5 INJECTION, POWDER, LYOPHILIZED, FOR SOLUTION INTRAVENOUS at 09:06

## 2019-05-20 RX ADMIN — Medication 100 MILLIEQUIVALENT(S): at 12:00

## 2019-05-20 RX ADMIN — Medication 5 MILLIGRAM(S): at 05:02

## 2019-05-20 RX ADMIN — LOSARTAN POTASSIUM 50 MILLIGRAM(S): 100 TABLET, FILM COATED ORAL at 13:57

## 2019-05-20 RX ADMIN — Medication 5 MILLIGRAM(S): at 00:01

## 2019-05-20 RX ADMIN — LIDOCAINE 1 PATCH: 4 CREAM TOPICAL at 21:23

## 2019-05-20 RX ADMIN — PANTOPRAZOLE SODIUM 40 MILLIGRAM(S): 20 TABLET, DELAYED RELEASE ORAL at 09:06

## 2019-05-20 RX ADMIN — Medication 20 MILLIGRAM(S): at 14:37

## 2019-05-20 RX ADMIN — LIDOCAINE 1 PATCH: 4 CREAM TOPICAL at 10:06

## 2019-05-20 RX ADMIN — OXYCODONE HYDROCHLORIDE 5 MILLIGRAM(S): 5 TABLET ORAL at 23:36

## 2019-05-20 RX ADMIN — Medication 100 MILLIEQUIVALENT(S): at 10:45

## 2019-05-20 RX ADMIN — LIDOCAINE 1 PATCH: 4 CREAM TOPICAL at 06:53

## 2019-05-20 RX ADMIN — Medication 100 MILLIEQUIVALENT(S): at 09:08

## 2019-05-20 RX ADMIN — DEXTROSE MONOHYDRATE, SODIUM CHLORIDE, AND POTASSIUM CHLORIDE 50 MILLILITER(S): 50; .745; 4.5 INJECTION, SOLUTION INTRAVENOUS at 13:57

## 2019-05-20 RX ADMIN — PIPERACILLIN AND TAZOBACTAM 25 GRAM(S): 4; .5 INJECTION, POWDER, LYOPHILIZED, FOR SOLUTION INTRAVENOUS at 00:22

## 2019-05-20 RX ADMIN — Medication 20 MILLIGRAM(S): at 07:03

## 2019-05-20 RX ADMIN — Medication 5 MILLIGRAM(S): at 12:54

## 2019-05-20 RX ADMIN — OXYCODONE HYDROCHLORIDE 5 MILLIGRAM(S): 5 TABLET ORAL at 06:02

## 2019-05-20 NOTE — PROVIDER CONTACT NOTE (OTHER) - ASSESSMENT
Patient ordered for Lopressor IVP q 6 hours. Pt's HR on continuous pulse ox showing 48-52. Repeated BP of 140/80s HR 48. According to parameter Lopressor should be held for HR lower than 60.
Patient alert and oriented times 4. Vitals signs stable. no chest pain
Pt is asymptomatic
Vitals stable, abdominal discomfort noted.
diaphoretic, labored breathing, verbally stating its difficult

## 2019-05-20 NOTE — PROVIDER CONTACT NOTE (OTHER) - BACKGROUND
ex lap, hernia repair with mesh 5/13
ex lap, hernia repair with mesh 5/13
s/p IR procedure for embolization of bleeding, s/p 1u of plasma
s/p ex-lap c/b wound hematoma requiring RTOR for hematoma evacuation and mesh replacement
s/p hematomna evacuation and mesh replacement

## 2019-05-20 NOTE — PROGRESS NOTE ADULT - SUBJECTIVE AND OBJECTIVE BOX
[facility] DAILY PROGRESS NOTE    SUBJECTIVE:  -  pain slightly increased this morning, requiring oxycodone; pain mostly in back    OBJECTIVE:    Vital Signs Last 24 Hrs  T(C): 36.6 (20 May 2019 05:00), Max: 37 (19 May 2019 21:06)  T(F): 97.8 (20 May 2019 05:00), Max: 98.6 (19 May 2019 21:06)  HR: 88 (20 May 2019 06:30) (78 - 106)  BP: 152/88 (20 May 2019 06:30) (138/78 - 163/94)  BP(mean): --  RR: 18 (20 May 2019 05:00) (18 - 20)  SpO2: 96% (20 May 2019 05:00) (93% - 96%)    EXAM:  Constitutional: AAOx3, NAD  Respiratory: Nonlabored breathing.  Gastrointestinal: soft, mildly distended, NTTP, No rebound or guarding                          aquacel dressing changed, incision c/d/i                          DEREK with SS output

## 2019-05-20 NOTE — PROGRESS NOTE ADULT - ATTENDING COMMENTS
Patient seen and examined   agree with above plan    discussed progress with patient and wife by cell phone
Pt seen/examined with PA, agree with above.  Clinically stable. Will hold off transfusion, unless symptomatic.
Pt seen/examined, agree with above
advance to full liquids if hct stable  fu hgb at 1pm   SL IV  multiple bms brown no blood  abd soft benign, wound looks good   Patient seen and examined   agree with above plan

## 2019-05-20 NOTE — PROGRESS NOTE ADULT - SUBJECTIVE AND OBJECTIVE BOX
ANESTHESIA POSTOP CHECK    63y Male POSTOP DAY 1 S/P     Vital Signs Last 24 Hrs  T(C): 36.6 (20 May 2019 05:00), Max: 37 (19 May 2019 21:06)  T(F): 97.8 (20 May 2019 05:00), Max: 98.6 (19 May 2019 21:06)  HR: 88 (20 May 2019 06:30) (78 - 106)  BP: 152/88 (20 May 2019 06:30) (138/78 - 163/94)  BP(mean): --  RR: 18 (20 May 2019 05:00) (18 - 20)  SpO2: 96% (20 May 2019 05:00) (93% - 96%)  I&O's Summary    19 May 2019 07:01  -  20 May 2019 07:00  --------------------------------------------------------  IN: 2925 mL / OUT: 1631.5 mL / NET: 1293.5 mL        [x ] NO APPARENT ANESTHESIA COMPLICATIONS      Comments:

## 2019-05-20 NOTE — PROGRESS NOTE ADULT - SUBJECTIVE AND OBJECTIVE BOX
INTERVAL HPI/OVERNIGHT EVENTS: Pt seen and examined. States he is feeling better. +GIF. Denies N/V    STATUS POST:  Laparotomy, SBR, repair incisional hernia with Phasix. Returned to OR on POD#1 for evacuation abd wall hematoma, s/p IR embolization on 5/18    POST OPERATIVE DAY #: 7/6    MEDICATIONS  (STANDING):  dextrose 5% + sodium chloride 0.45% with potassium chloride 20 mEq/L 1000 milliLiter(s) (50 mL/Hr) IV Continuous <Continuous>  losartan 50 milliGRAM(s) Oral daily  metoprolol tartrate Injectable 5 milliGRAM(s) IV Push every 6 hours  NIFEdipine IR 20 milliGRAM(s) Oral three times a day  pantoprazole  Injectable 40 milliGRAM(s) IV Push daily  potassium chloride  10 mEq/100 mL IVPB 10 milliEquivalent(s) IV Intermittent every 1 hour    MEDICATIONS  (PRN):  LORazepam   Injectable 0.25 milliGRAM(s) IV Push daily PRN Anxiety  oxyCODONE    IR 5 milliGRAM(s) Oral every 4 hours PRN Moderate Pain (4 - 6)  oxyCODONE    IR 10 milliGRAM(s) Oral every 4 hours PRN Severe Pain (7 - 10)      Vital Signs Last 24 Hrs  T(C): 36.6 (20 May 2019 10:16), Max: 37 (19 May 2019 21:06)  T(F): 97.9 (20 May 2019 10:16), Max: 98.6 (19 May 2019 21:06)  HR: 110 (20 May 2019 10:16) (78 - 110)  BP: 148/84 (20 May 2019 10:16) (138/78 - 163/94)  BP(mean): --  RR: 18 (20 May 2019 10:16) (18 - 20)  SpO2: 97% (20 May 2019 10:16) (93% - 97%)    PHYSICAL EXAM:      Constitutional: NAD    Gastrointestinal: Abd soft, NT, less distended. Incision c/d/i. Drain with SSF    Genitourinary: Scrotal swelling            I&O's Detail    19 May 2019 07:01  -  20 May 2019 07:00  --------------------------------------------------------  IN:    dextrose 5% + sodium chloride 0.45% with potassium chloride 20 mEq/L: 2325 mL    IV PiggyBack: 300 mL    Packed Red Blood Cells: 300 mL  Total IN: 2925 mL    OUT:    Bulb: 4.5 mL    Stool: 2 mL    Voided: 1625 mL  Total OUT: 1631.5 mL    Total NET: 1293.5 mL      20 May 2019 07:01  -  20 May 2019 12:10  --------------------------------------------------------  IN:  Total IN: 0 mL    OUT:    Voided: 200 mL  Total OUT: 200 mL    Total NET: -200 mL          LABS:                        8.8    9.29  )-----------( 311      ( 20 May 2019 10:51 )             26.1     05-20    139  |  102  |  14  ----------------------------<  159<H>  3.5   |  24  |  0.98    Ca    8.5      20 May 2019 07:10  Phos  3.3     05-20  Mg     1.8     05-20      PT/INR - ( 18 May 2019 19:42 )   PT: 14.3 SEC;   INR: 1.28          PTT - ( 18 May 2019 19:42 )  PTT:33.2 SEC      RADIOLOGY & ADDITIONAL STUDIES:

## 2019-05-20 NOTE — PROVIDER CONTACT NOTE (OTHER) - ACTION/TREATMENT ORDERED:
Lopressor held, will continue to monitor.
EKG and CBC stat  Monitor patient and re-assess BP after BP medications
PA made aware, PA is aware of distention as assessed during her rounds, will order IV tylenol for abdominal discomfort
md made aware, put pt on 4L NC 02 and will assess pt
monitor pt

## 2019-05-20 NOTE — PROVIDER CONTACT NOTE (OTHER) - RECOMMENDATIONS
Nutrition Care Plan     Nutrition Diagnosis:   Altered nutrition-related laboratory values related to DM, chronic toe osteomyelitis as evidenced by hyperglycemia.     Intervention:  Modified diet:  On consistent carbohydrate moderate diet.  Medications:   Continue to adjust insulin as indicated.     Monitoring and Evaluation:  Glucose, casual:  Goal: <180. Glucose 152-217 today. Pt eating 100% of meals TID.   
Hold Lopressor
Monitor his H&H since he has been trending down  Assess patient
Monitor pt.
Monitor pt.
Monitor pt. finish blood
PA to come and assess patient
md notify

## 2019-05-21 LAB
ANION GAP SERPL CALC-SCNC: 14 MMO/L — SIGNIFICANT CHANGE UP (ref 7–14)
BUN SERPL-MCNC: 14 MG/DL — SIGNIFICANT CHANGE UP (ref 7–23)
CALCIUM SERPL-MCNC: 9 MG/DL — SIGNIFICANT CHANGE UP (ref 8.4–10.5)
CHLORIDE SERPL-SCNC: 101 MMOL/L — SIGNIFICANT CHANGE UP (ref 98–107)
CO2 SERPL-SCNC: 25 MMOL/L — SIGNIFICANT CHANGE UP (ref 22–31)
CREAT SERPL-MCNC: 0.96 MG/DL — SIGNIFICANT CHANGE UP (ref 0.5–1.3)
GLUCOSE SERPL-MCNC: 139 MG/DL — HIGH (ref 70–99)
HCT VFR BLD CALC: 26.5 % — LOW (ref 39–50)
HGB BLD-MCNC: 8.8 G/DL — LOW (ref 13–17)
MAGNESIUM SERPL-MCNC: 2 MG/DL — SIGNIFICANT CHANGE UP (ref 1.6–2.6)
MCHC RBC-ENTMCNC: 29 PG — SIGNIFICANT CHANGE UP (ref 27–34)
MCHC RBC-ENTMCNC: 33.2 % — SIGNIFICANT CHANGE UP (ref 32–36)
MCV RBC AUTO: 87.5 FL — SIGNIFICANT CHANGE UP (ref 80–100)
NRBC # FLD: 0 K/UL — SIGNIFICANT CHANGE UP (ref 0–0)
PHOSPHATE SERPL-MCNC: 3.4 MG/DL — SIGNIFICANT CHANGE UP (ref 2.5–4.5)
PLATELET # BLD AUTO: 309 K/UL — SIGNIFICANT CHANGE UP (ref 150–400)
PMV BLD: 9.1 FL — SIGNIFICANT CHANGE UP (ref 7–13)
POTASSIUM SERPL-MCNC: 3.6 MMOL/L — SIGNIFICANT CHANGE UP (ref 3.5–5.3)
POTASSIUM SERPL-SCNC: 3.6 MMOL/L — SIGNIFICANT CHANGE UP (ref 3.5–5.3)
RBC # BLD: 3.03 M/UL — LOW (ref 4.2–5.8)
RBC # FLD: 14.9 % — HIGH (ref 10.3–14.5)
SODIUM SERPL-SCNC: 140 MMOL/L — SIGNIFICANT CHANGE UP (ref 135–145)
WBC # BLD: 8.31 K/UL — SIGNIFICANT CHANGE UP (ref 3.8–10.5)
WBC # FLD AUTO: 8.31 K/UL — SIGNIFICANT CHANGE UP (ref 3.8–10.5)

## 2019-05-21 RX ORDER — POTASSIUM CHLORIDE 20 MEQ
10 PACKET (EA) ORAL
Refills: 0 | Status: COMPLETED | OUTPATIENT
Start: 2019-05-21 | End: 2019-05-21

## 2019-05-21 RX ORDER — DIBUCAINE 1 %
1 OINTMENT (GRAM) RECTAL THREE TIMES A DAY
Refills: 0 | Status: DISCONTINUED | OUTPATIENT
Start: 2019-05-21 | End: 2019-05-23

## 2019-05-21 RX ORDER — SODIUM CHLORIDE 9 MG/ML
3 INJECTION INTRAMUSCULAR; INTRAVENOUS; SUBCUTANEOUS EVERY 8 HOURS
Refills: 0 | Status: DISCONTINUED | OUTPATIENT
Start: 2019-05-21 | End: 2019-05-23

## 2019-05-21 RX ADMIN — SODIUM CHLORIDE 3 MILLILITER(S): 9 INJECTION INTRAMUSCULAR; INTRAVENOUS; SUBCUTANEOUS at 13:54

## 2019-05-21 RX ADMIN — OXYCODONE HYDROCHLORIDE 5 MILLIGRAM(S): 5 TABLET ORAL at 00:06

## 2019-05-21 RX ADMIN — Medication 20 MILLIGRAM(S): at 05:10

## 2019-05-21 RX ADMIN — LIDOCAINE 1 PATCH: 4 CREAM TOPICAL at 09:50

## 2019-05-21 RX ADMIN — Medication 650 MILLIGRAM(S): at 06:09

## 2019-05-21 RX ADMIN — Medication 20 MILLIGRAM(S): at 22:10

## 2019-05-21 RX ADMIN — Medication 20 MILLIGRAM(S): at 14:43

## 2019-05-21 RX ADMIN — Medication 650 MILLIGRAM(S): at 00:06

## 2019-05-21 RX ADMIN — Medication 100 MILLIEQUIVALENT(S): at 12:00

## 2019-05-21 RX ADMIN — LOSARTAN POTASSIUM 50 MILLIGRAM(S): 100 TABLET, FILM COATED ORAL at 13:53

## 2019-05-21 RX ADMIN — SODIUM CHLORIDE 3 MILLILITER(S): 9 INJECTION INTRAMUSCULAR; INTRAVENOUS; SUBCUTANEOUS at 23:56

## 2019-05-21 RX ADMIN — Medication 200 MILLIGRAM(S): at 05:10

## 2019-05-21 RX ADMIN — LIDOCAINE 1 PATCH: 4 CREAM TOPICAL at 06:11

## 2019-05-21 RX ADMIN — Medication 100 MILLIEQUIVALENT(S): at 11:00

## 2019-05-21 RX ADMIN — Medication 100 MILLIEQUIVALENT(S): at 09:59

## 2019-05-21 RX ADMIN — PANTOPRAZOLE SODIUM 40 MILLIGRAM(S): 20 TABLET, DELAYED RELEASE ORAL at 09:59

## 2019-05-21 RX ADMIN — Medication 650 MILLIGRAM(S): at 05:10

## 2019-05-21 NOTE — DIETITIAN INITIAL EVALUATION ADULT. - OTHER INFO
Initial Dietitian Evaluation 2/2 to extended length of stay. Pt remains s/p Laparotomy, SBR, mesh removal, Repair of incisional hernia with Phasix. return to OR for evacuation abd wall hematoma. IR embolization Lt Epigastric artery. Pt was NPO to---Clears to----Full liquids diet during most of the hospital stay. Low residue diet started today, was able to tolerate breakfast tray without nausea or vomiting.

## 2019-05-21 NOTE — PROGRESS NOTE ADULT - SUBJECTIVE AND OBJECTIVE BOX
INTERVAL HPI/OVERNIGHT EVENTS: pt feels much better. Passing flatus and BMs. No abdominal pain. No SOB    STATUS POST: Laparotomy, SBR, mesh removal, Repair of incisional hernia with Phasix. return to OR for evacuation abd wall hematoma. IR embolization Lt epigastric artery    POST OPERATIVE DAY #: POD#8/7/3    MEDICATIONS  (STANDING):  acetaminophen   Tablet .. 650 milliGRAM(s) Oral every 6 hours  dextrose 5% + sodium chloride 0.45% with potassium chloride 20 mEq/L 1000 milliLiter(s) (50 mL/Hr) IV Continuous <Continuous>  losartan 50 milliGRAM(s) Oral daily  metoprolol succinate  milliGRAM(s) Oral daily  NIFEdipine IR 20 milliGRAM(s) Oral three times a day  pantoprazole  Injectable 40 milliGRAM(s) IV Push daily  potassium chloride  10 mEq/100 mL IVPB 10 milliEquivalent(s) IV Intermittent every 1 hour    MEDICATIONS  (PRN):  LORazepam   Injectable 0.25 milliGRAM(s) IV Push daily PRN Anxiety  oxyCODONE    IR 5 milliGRAM(s) Oral every 4 hours PRN Moderate Pain (4 - 6)  oxyCODONE    IR 10 milliGRAM(s) Oral every 4 hours PRN Severe Pain (7 - 10)      Vital Signs Last 24 Hrs  T(C): 36.7 (21 May 2019 05:10), Max: 37.1 (21 May 2019 01:32)  T(F): 98.1 (21 May 2019 05:10), Max: 98.7 (21 May 2019 01:32)  HR: 88 (21 May 2019 05:10) (88 - 110)  BP: 148/84 (21 May 2019 05:10) (142/85 - 158/88)  BP(mean): --  RR: 18 (21 May 2019 05:10) (16 - 18)  SpO2: 99% (21 May 2019 05:10) (93% - 99%)  I&O's Detail    20 May 2019 07:01  -  21 May 2019 07:00  --------------------------------------------------------  IN:    dextrose 5% + sodium chloride 0.45% with potassium chloride 20 mEq/L: 1300 mL    IV PiggyBack: 350 mL    Oral Fluid: 960 mL  Total IN: 2610 mL    OUT:    Bulb: 5.5 mL    Voided: 1900 mL  Total OUT: 1905.5 mL    Total NET: 704.5 mL      Abdominal: Soft, NT, significantly less distended, wound c/d/i. Drain serosanguineous, +BS, Last BM  Extremities: No edema, + peripheral pulses        LABS:                        8.8    8.31  )-----------( 309      ( 21 May 2019 06:10 )             26.5     05-21    140  |  101  |  14  ----------------------------<  139<H>  3.6   |  25  |  0.96    Ca    9.0      21 May 2019 06:10  Phos  3.4     05-21  Mg     2.0     05-21            RADIOLOGY & ADDITIONAL STUDIES:

## 2019-05-22 LAB
ANION GAP SERPL CALC-SCNC: 14 MMO/L — SIGNIFICANT CHANGE UP (ref 7–14)
BUN SERPL-MCNC: 16 MG/DL — SIGNIFICANT CHANGE UP (ref 7–23)
CALCIUM SERPL-MCNC: 9.2 MG/DL — SIGNIFICANT CHANGE UP (ref 8.4–10.5)
CHLORIDE SERPL-SCNC: 102 MMOL/L — SIGNIFICANT CHANGE UP (ref 98–107)
CO2 SERPL-SCNC: 23 MMOL/L — SIGNIFICANT CHANGE UP (ref 22–31)
CREAT SERPL-MCNC: 0.97 MG/DL — SIGNIFICANT CHANGE UP (ref 0.5–1.3)
GLUCOSE SERPL-MCNC: 142 MG/DL — HIGH (ref 70–99)
HCT VFR BLD CALC: 28.5 % — LOW (ref 39–50)
HGB BLD-MCNC: 9.4 G/DL — LOW (ref 13–17)
MAGNESIUM SERPL-MCNC: 1.8 MG/DL — SIGNIFICANT CHANGE UP (ref 1.6–2.6)
MCHC RBC-ENTMCNC: 29 PG — SIGNIFICANT CHANGE UP (ref 27–34)
MCHC RBC-ENTMCNC: 33 % — SIGNIFICANT CHANGE UP (ref 32–36)
MCV RBC AUTO: 88 FL — SIGNIFICANT CHANGE UP (ref 80–100)
NRBC # FLD: 0.02 K/UL — SIGNIFICANT CHANGE UP (ref 0–0)
PHOSPHATE SERPL-MCNC: 3.6 MG/DL — SIGNIFICANT CHANGE UP (ref 2.5–4.5)
PLATELET # BLD AUTO: 360 K/UL — SIGNIFICANT CHANGE UP (ref 150–400)
PMV BLD: 8.8 FL — SIGNIFICANT CHANGE UP (ref 7–13)
POTASSIUM SERPL-MCNC: 3.2 MMOL/L — LOW (ref 3.5–5.3)
POTASSIUM SERPL-SCNC: 3.2 MMOL/L — LOW (ref 3.5–5.3)
RBC # BLD: 3.24 M/UL — LOW (ref 4.2–5.8)
RBC # FLD: 15 % — HIGH (ref 10.3–14.5)
SODIUM SERPL-SCNC: 139 MMOL/L — SIGNIFICANT CHANGE UP (ref 135–145)
WBC # BLD: 7.97 K/UL — SIGNIFICANT CHANGE UP (ref 3.8–10.5)
WBC # FLD AUTO: 7.97 K/UL — SIGNIFICANT CHANGE UP (ref 3.8–10.5)

## 2019-05-22 RX ORDER — IBUPROFEN 200 MG
1 TABLET ORAL
Qty: 0 | Refills: 0 | DISCHARGE

## 2019-05-22 RX ORDER — ACETAMINOPHEN 500 MG
2 TABLET ORAL
Qty: 0 | Refills: 0 | DISCHARGE
Start: 2019-05-22

## 2019-05-22 RX ORDER — POTASSIUM CHLORIDE 20 MEQ
40 PACKET (EA) ORAL ONCE
Refills: 0 | Status: COMPLETED | OUTPATIENT
Start: 2019-05-22 | End: 2019-05-22

## 2019-05-22 RX ORDER — OXYCODONE HYDROCHLORIDE 5 MG/1
1 TABLET ORAL
Qty: 0 | Refills: 0 | DISCHARGE
Start: 2019-05-22

## 2019-05-22 RX ORDER — OXYCODONE HYDROCHLORIDE 5 MG/1
1 TABLET ORAL
Qty: 30 | Refills: 0
Start: 2019-05-22

## 2019-05-22 RX ADMIN — Medication 650 MILLIGRAM(S): at 02:19

## 2019-05-22 RX ADMIN — Medication 40 MILLIEQUIVALENT(S): at 08:41

## 2019-05-22 RX ADMIN — Medication 20 MILLIGRAM(S): at 22:06

## 2019-05-22 RX ADMIN — Medication 20 MILLIGRAM(S): at 13:31

## 2019-05-22 RX ADMIN — LOSARTAN POTASSIUM 50 MILLIGRAM(S): 100 TABLET, FILM COATED ORAL at 13:31

## 2019-05-22 RX ADMIN — SODIUM CHLORIDE 3 MILLILITER(S): 9 INJECTION INTRAMUSCULAR; INTRAVENOUS; SUBCUTANEOUS at 13:30

## 2019-05-22 RX ADMIN — SODIUM CHLORIDE 3 MILLILITER(S): 9 INJECTION INTRAMUSCULAR; INTRAVENOUS; SUBCUTANEOUS at 22:06

## 2019-05-22 RX ADMIN — Medication 650 MILLIGRAM(S): at 03:19

## 2019-05-22 RX ADMIN — Medication 20 MILLIGRAM(S): at 06:12

## 2019-05-22 RX ADMIN — SODIUM CHLORIDE 3 MILLILITER(S): 9 INJECTION INTRAMUSCULAR; INTRAVENOUS; SUBCUTANEOUS at 05:46

## 2019-05-22 RX ADMIN — PANTOPRAZOLE SODIUM 40 MILLIGRAM(S): 20 TABLET, DELAYED RELEASE ORAL at 08:37

## 2019-05-22 RX ADMIN — Medication 200 MILLIGRAM(S): at 06:13

## 2019-05-22 NOTE — DISCHARGE NOTE NURSING/CASE MANAGEMENT/SOCIAL WORK - NSDCDPATPORTLINK_GEN_ALL_CORE
You can access the [facility] Patient Portal, offered by [facility], by registering with the following website: [email or web address]

## 2019-05-22 NOTE — DISCHARGE NOTE NURSING/CASE MANAGEMENT/SOCIAL WORK - NSDCPNINST_GEN_ALL_CORE
Report of any redness, drainage, swelling, fever or pain not relieved by pain medication.  Please empty drain at least twice a day depending on output and write it down on a piece of paper with amount, date and time for follow up. Instructed to notify in case of severe pain, nausea and vomiting, redness or drainage at the incision site

## 2019-05-22 NOTE — PROGRESS NOTE ADULT - SUBJECTIVE AND OBJECTIVE BOX
Surgery Progress Note    S:     Patient seen and examined. No acute events overnight. Feeling well this morning, pain controlled, tolerating diet. Ambulating and voiding without difficulty. +/+.    O:    Vital Signs Last 24 Hrs  T(C): 36.6 (22 May 2019 06:20), Max: 37.3 (22 May 2019 02:32)  T(F): 97.9 (22 May 2019 06:20), Max: 99.1 (22 May 2019 02:32)  HR: 92 (22 May 2019 06:20) (88 - 100)  BP: 154/94 (22 May 2019 06:20) (148/93 - 163/89)  BP(mean): --  RR: 18 (22 May 2019 06:20) (17 - 18)  SpO2: 96% (22 May 2019 06:20) (95% - 98%)    Physical Exam:  Gen: NAD  Resp: Unlabored breathing  Abd: soft, NTND, slight surrounding bruising, improving, no rebound or guarding, midline incision c/d/i, staple in place  Ext: WWP  Skin: No rashes    I&O's Detail    21 May 2019 07:01  -  22 May 2019 07:00  --------------------------------------------------------  IN:    dextrose 5% + sodium chloride 0.45% with potassium chloride 20 mEq/L: 200 mL    IV PiggyBack: 300 mL    Oral Fluid: 720 mL  Total IN: 1220 mL    OUT:    Bulb: 12.5 mL    Voided: 750 mL  Total OUT: 762.5 mL    Total NET: 457.5 mL      22 May 2019 07:01  -  22 May 2019 10:43  --------------------------------------------------------  IN:    Oral Fluid: 240 mL  Total IN: 240 mL    OUT:    Bulb: 2.5 mL  Total OUT: 2.5 mL    Total NET: 237.5 mL          MEDICATIONS  (STANDING):  acetaminophen   Tablet .. 650 milliGRAM(s) Oral every 6 hours  losartan 50 milliGRAM(s) Oral daily  metoprolol succinate  milliGRAM(s) Oral daily  NIFEdipine IR 20 milliGRAM(s) Oral three times a day  pantoprazole  Injectable 40 milliGRAM(s) IV Push daily  sodium chloride 0.9% lock flush 3 milliLiter(s) IV Push every 8 hours    MEDICATIONS  (PRN):  dibucaine 1% Ointment 1 Application(s) Topical three times a day PRN hemrrhoid irritation  LORazepam   Injectable 0.25 milliGRAM(s) IV Push daily PRN Anxiety  oxyCODONE    IR 5 milliGRAM(s) Oral every 4 hours PRN Moderate Pain (4 - 6)  oxyCODONE    IR 10 milliGRAM(s) Oral every 4 hours PRN Severe Pain (7 - 10)      Labs:                          9.4    7.97  )-----------( 360      ( 22 May 2019 06:00 )             28.5       05-22    139  |  102  |  16  ----------------------------<  142<H>  3.2<L>   |  23  |  0.97    Ca    9.2      22 May 2019 06:00  Phos  3.6     05-22  Mg     1.8     05-22        Radiology:

## 2019-05-22 NOTE — DISCHARGE NOTE PROVIDER - CARE PROVIDER_API CALL
Precious Cox (MD)  ColonRectal Surgery; Surgery  [address], [address]  [place] [address]  Phone: [phone]  Fax: [phone]  Follow Up Time: 1-3 days

## 2019-05-22 NOTE — DISCHARGE NOTE NURSING/CASE MANAGEMENT/SOCIAL WORK - NSDCPEEMAIL_GEN_ALL_CORE
[facility] email [email or web address]

## 2019-05-22 NOTE — DISCHARGE NOTE PROVIDER - HOSPITAL COURSE
64 y/o male with hx of ventral hernia x2 years, which has  been enlarging over time.  Pt denies abdominal pain.  Scheduled for Ventral hernia repair 5/13/19.         Patient is status post celiotomy, takedown of enterocutaneous fistula, small bowel resection with anatomical side to side, functional end to end anastomosis, incisional hernia repair with Phasix mesh (onlay), removal of foreign body (previous mesh) on 5/13/19.        On POD 1, patient found to have an abdominal wall hematoma.  Patient was taken back to the OR and is status post Reopening of midline incision revealed 500cc blood and clot, evacuated. Mesh explanted. Bleeding perforating vessel found and ligated. New mesh implanted. Wound copiously irrigated. Closed with 0 vicryl and staples.19Fr micky in subQ.        Patient used a PCA for pain control post operatively.  He also had post op ileus and NGT remained in place for 3 days.  He regained bowel function and diet was advanced and was started on oral pain medication.        Patient found to have decreased H/H and was transfused 2 Unite PRBCs on 5/18.  CTA showed hematomas and active extravasation.  SICU was consulted for HD monitoring, but patient did not require admission to unit.         Patient was taken to the interventional radiology suite and is status post Angiography and reveals foci of active bleeding originating from a branch of the left inferior epigastric artery and a branch of the right epigastric artery. Both inferior epigastric arteries were embolized utilizing combination of gelfoam slurry and microcoils.         Post procedure, patient became SOB and diaphoretic and decreased sats.  He was given Lasix with good response.  He also had multiple bowel movements and felt better.         Patient is tolerating regular diet, pain is well controlled.  He is ambulating and voiding without difficulty.  He will be discharged home with a DEREK drain and follow up with Dr Perez on Friday to remove the drain.

## 2019-05-22 NOTE — DISCHARGE NOTE NURSING/CASE MANAGEMENT/SOCIAL WORK - NSDCPEWEB_GEN_ALL_CORE
[place] website --- [email or web address] website --- [email or web address]

## 2019-05-22 NOTE — DISCHARGE NOTE PROVIDER - NSDCFUADDINST_GEN_ALL_CORE_FT
WOUND CARE:    BATHING: Please do not submerge wound underwater. You may shower and/or sponge bathe.  ACTIVITY: No heavy lifting or straining. Otherwise, you may return to your usual level of physical activity. If you are taking narcotic pain medication (such as Percocet) DO NOT drive a car, operate machinery or make important decisions.  DIET: Return to your usual diet.  NOTIFY YOUR SURGEON IF: You have any bleeding that does not stop, any pus draining from your wound(s), any fever (over 100.4 F) or chills, persistent nausea/vomiting, persistent diarrhea, or if your pain is not controlled on your discharge pain medications.  FOLLOW-UP: Please follow up with your primary care physician in one week regarding your hospitalization   You will be discharged with a DEREK drain. You will need to empty it and record outputs accurately. This will be taught to you by the nursing staff. Please do not remove the DEREK drain. It will be removed in the office on Friday 5/24. Please bring to the office accurate records of output.

## 2019-05-23 VITALS
OXYGEN SATURATION: 97 % | TEMPERATURE: 98 F | RESPIRATION RATE: 18 BRPM | DIASTOLIC BLOOD PRESSURE: 84 MMHG | HEART RATE: 93 BPM | SYSTOLIC BLOOD PRESSURE: 146 MMHG

## 2019-05-23 RX ADMIN — Medication 20 MILLIGRAM(S): at 05:34

## 2019-05-23 RX ADMIN — SODIUM CHLORIDE 3 MILLILITER(S): 9 INJECTION INTRAMUSCULAR; INTRAVENOUS; SUBCUTANEOUS at 05:33

## 2019-05-23 RX ADMIN — Medication 200 MILLIGRAM(S): at 05:33

## 2019-05-23 NOTE — PROGRESS NOTE ADULT - SUBJECTIVE AND OBJECTIVE BOX
[facility] SURGERY DAILY PROGRESS NOTE    SUBJECTIVE:  -  no issues overnight, stayed because nobody at home for assistance last night    OBJECTIVE:    Vital Signs Last 24 Hrs  T(C): 36.7 (23 May 2019 05:32), Max: 36.9 (22 May 2019 22:00)  T(F): 98 (23 May 2019 05:32), Max: 98.4 (22 May 2019 22:00)  HR: 101 (23 May 2019 05:32) (83 - 101)  BP: 166/90 (23 May 2019 05:32) (140/90 - 166/90)  BP(mean): --  RR: 17 (23 May 2019 05:32) (17 - 18)  SpO2: 96% (23 May 2019 05:32) (96% - 98%)    EXAM:  Gen: NAD  Resp: Unlabored breathing  Abd: soft, NTND, no rebound or guarding, midline incision c/d/i, staple in place  Ext: WWP  Skin: No rashes

## 2019-05-23 NOTE — PROGRESS NOTE ADULT - PROVIDER SPECIALTY LIST ADULT
Anesthesia
Colorectal Surgery
Intervent Radiology
Pain Medicine
SICU
SICU
Surgery

## 2019-05-23 NOTE — PROGRESS NOTE ADULT - ASSESSMENT
62yo M s/p celiotomy, takedown of enterocutaneous fistula, small bowel resection with anatomical side to side, functional end to end anastomosis, incisional hernia repair with Phasix mesh (onlay), removal of foreign body (previous mesh) (5/13), c/b wound hematoma requiring RTOR for hematoma evacuation and mesh replacement (5/14)    PLAN:  -  c/w abd binder  -  CBC AM only, H/H stable and source of bleed controlled  -  c/w IV zosyn  -  CLD today  -  d/c paulino  -  c/w PCA dilaudid, have pt talk to PAIN re dilaudid dosing  -  monitor drain output    A SURGERY  [id number]
Doing well. Stable HH and VSS. Saturating well on room air.    - advance to reg diet  - SL IV  - ambulate
62y/o POD#1, S/P laparot, SBR, repair of incisional hernia with Phasix. Incisional hematoma, stable VSS,    - repeat HH at 2pm  - monitor drain output  - likely return to OR for evacuation of hematoma  - hold SQ Heparin  - keep Blanco due to flap hematoma, monitor bleeding  - IV Zosyn due to SB resection and infected mesh
63M Hx htn, Ventral hernia repair presented for elective hernia repair 5/13. Patient RTOR 5/14 for hematoma evacuation and ligation of bleeding vessel. Patient continued to have downtrended H/h and found to have active mesenteric extravasation 5/18.     Will monitor patient. Trend H/H      NEURO  - LORazepam   Injectable 0.25 milliGRAM(s) daily PRN  - oxyCODONE    IR 5 milliGRAM(s) every 4 hours PRN  - oxyCODONE    IR 10 milliGRAM(s) every 4 hours PRN    RESPIRATORY  -Satting 98% on NC  - Chest PT  - IS 10x/h  - OOB     CARDIOVASCULAR  - losartan 50 milliGRAM(s) daily  - metoprolol tartrate Injectable 5 milliGRAM(s) every 6 hours  - NIFEdipine IR 20 milliGRAM(s) three times a day  - IV fluids    GI/NUTRITION  - NPO  - pantoprazole  Injectable 40 milliGRAM(s) daily    GENITOURINARY/RENAL  - Consider paulino for Strict urine outputs    HEMATOLOGIC  -  Hold DVT ppx    INFECTIOUS DISEASES  - piperacillin/tazobactam IVPB. 3.375 Gram(s) every 8 hours  - Consider stopping antibiotics    ENDOCRINE  - Routine concerns
63M POD#2 s/p ex-lap, SBR, repair of incisional hernia with Phasix now s/p RTOR for evacuation of abd wall flap hematoma    Pt seen and examined with Dr. Cox  - Sips of clears - monitor N/V  - d/c Blanco  - Pain control - PCA and IV Tylenol q6  - Monitor H/H, transfuse if Hgb<7  - c/w IV Zosyn  - c/w Abd binder  - OOB & amb
63M POD#3 s/p ex-lap, SBR, repair of incisional hernia with Phasix  POD#2 s/p RTOR for evacuation of abd wall flap hematoma, with post-op ileus    Pt d/w Dr. Perez  - c/w NGT  - NPO  - Pain control with PCA and IV Tylenol q6  - OOB & amb  - c/w Zosyn  - c/w Abdominal binder
63M POD#7 s/p ex-lap, SBR, repair incisional hernia with Phasix. Returned to OR on POD#1 for evacuation abd wall hematoma, s/p IR embolization on 5/18, recovering well    Pt seen and examined with Dr. Cox  - advance to CLD  - monitor H/H  - elevate scrotum  - pain control  - c/w Abd binder  - OOB & amb
63M POD#9 s/p ex-lap, SBR, repair incisional hernia with Phasix. Returned to OR on POD#1 for evacuation abd wall hematoma, s/p IR embolization on 5/18, recovering well.    Pt seen and examined with Dr. Cox  - regular diet  - monitor H/H  - elevate scrotum  - pain control  - c/w Abd dayna BULLARD & amb  - dispo planning    A team surgery  [id number]
63y year old Male who presents with  - Admit to A Team Surgery  - Multimodal pain control; c/w PCA and IV Tylenol  - NPO/IVF  - Blanco in place; monitor I/Os  - c/w lovenox: VTE prophylaxis    [id number]
64y/o s/p laparotomy, repair incisional hernia, SBR (5/13). RTOR on POD#2 for abd wall hematoma. Post operative ileus improving. Postoperative anemia with poor response to transfusion yesterday.    Plan:  - transfusing 1 unit RBC this morning  - repeat  - NGT removed yesterday, CLD today  - decrease IVF 75cc/h  - OOB/ ambulate  - po pain control    A Team Surgery  [id number]
64y/o s/p laparotomy, repair incisional hernia, SBR. Return to OR on POD#1 for abd wall hematoma. Improving PO ileus. PO anemia.    - NGT clamp trial  - transfuse 1u PRBC  - decrease IVF 75cc/h  - d/c PCA  - ambulate
64yo M s/p ECF takedown, repair incisional hernia, SBR (5/13). RTOR for abd wall hematoma (5/15), then s/p IR embolization of bilat inferior epigastric arteries (5/18)    PLAN:  - trending H/H in AM only; stable  - restart FLD  - OOB to ambulate    A SURGERY  [id number]
64yo M s/p celiotomy, takedown of enterocutaneous fistula, small bowel resection with anatomical side to side, functional end to end anastomosis, incisional hernia repair with Phasix mesh (onlay), removal of foreign body (previous mesh) (5/13)    PLAN:  -  CONCERN FOR ABD WALL FLAP HEMATOMA:  repeat CBC at 2pm, abd binder  -  IV zosyn  -  NPO/MIVF  -  c/w paulino  -  c/w PCA dilaudid, have pt talk to PAIN re dilaudid dosing    A SURGERY  [id number]
64yo M s/p celiotomy, takedown of enterocutaneous fistula, small bowel resection with anatomical side to side, functional end to end anastomosis, incisional hernia repair with Phasix mesh (onlay), removal of foreign body (previous mesh) (5/13), c/b wound hematoma requiring RTOR for hematoma evacuation and mesh replacement (5/14)    PLAN:  -  c/w abd binder  -  CBC AM only, H/H stable and source of bleed controlled  -  c/w IV zosyn  -  NPO/NGT/MIVF  -  c/w PCA dilaudid  -  monitor drain output  -  OOB to ambulate    A SURGERY  [id number]
64yo M s/p ex-lap, SBR, repair incisional hernia with Phasix (5/13). Returned to OR on POD#1 for evacuation abd wall hematoma, s/p IR embolization on 5/18, recovering well.    PLAN:  - regular diet  - monitor H/H  - elevate scrotum  - home today    A SURGERY  [id number]
Doing well. Stable VSS and HH. Good bowel function. Tolerating diet.    - d/c home with drain  - drain care teaching   - f/u in the office Friday to remove drain

## 2023-04-03 NOTE — PROGRESS NOTE ADULT - SUBJECTIVE AND OBJECTIVE BOX
Carmen Mondragon is a 63 year old female presenting to the walk-in clinic today for sinus issues.      Patient started having sinus issues for two weeks after seeing ENT.   She was seen with ENT two weeks ago. They did a CT scan of sinuses and it showed inflammation. They recommended surgery for sinuses.     She got worse after seeing them. Now she feels like the sinus is clogged. Headaches.     No cough or asthma issues.     No known exposure to COVID/FLU.     Swabs/Specimens collected during rooming process:  None     PPE worn during room process  Writer: N95, Face shield/eye protection, gown, gloves  Patient: mask    Patient would like communication of their results via:   [employer]   INTERVAL HPI/OVERNIGHT EVENTS: Pt feels great. tolerating diet. good bowel funtion. no abdominal pain.    STATUS POST:  laparotomy, SBR, repair of incisional hernia, mesh excision. Return to OR for evacuation abd wall hematoma. IR embolization of Lt epigastric artery    POST OPERATIVE DAY #: 9    MEDICATIONS  (STANDING):  acetaminophen   Tablet .. 650 milliGRAM(s) Oral every 6 hours  losartan 50 milliGRAM(s) Oral daily  metoprolol succinate  milliGRAM(s) Oral daily  NIFEdipine IR 20 milliGRAM(s) Oral three times a day  pantoprazole  Injectable 40 milliGRAM(s) IV Push daily  sodium chloride 0.9% lock flush 3 milliLiter(s) IV Push every 8 hours    MEDICATIONS  (PRN):  dibucaine 1% Ointment 1 Application(s) Topical three times a day PRN hemrrhoid irritation  LORazepam   Injectable 0.25 milliGRAM(s) IV Push daily PRN Anxiety  oxyCODONE    IR 5 milliGRAM(s) Oral every 4 hours PRN Moderate Pain (4 - 6)  oxyCODONE    IR 10 milliGRAM(s) Oral every 4 hours PRN Severe Pain (7 - 10)      Vital Signs Last 24 Hrs  T(C): 36.6 (22 May 2019 06:20), Max: 37.3 (22 May 2019 02:32)  T(F): 97.9 (22 May 2019 06:20), Max: 99.1 (22 May 2019 02:32)  HR: 92 (22 May 2019 06:20) (88 - 100)  BP: 154/94 (22 May 2019 06:20) (146/91 - 163/89)  BP(mean): --  RR: 18 (22 May 2019 06:20) (17 - 19)  SpO2: 96% (22 May 2019 06:20) (95% - 98%)  I&O's Detail    21 May 2019 07:01  -  22 May 2019 07:00  --------------------------------------------------------  IN:    dextrose 5% + sodium chloride 0.45% with potassium chloride 20 mEq/L: 200 mL    IV PiggyBack: 300 mL    Oral Fluid: 720 mL  Total IN: 1220 mL    OUT:    Bulb: 12.5 mL    Voided: 750 mL  Total OUT: 762.5 mL    Total NET: 457.5 mL        Abdominal: Soft, NT, ND +BS, wound c/d/i. drain serosanguineous        LABS:                        9.4    7.97  )-----------( 360      ( 22 May 2019 06:00 )             28.5     05-22    139  |  102  |  16  ----------------------------<  142<H>  3.2<L>   |  23  |  0.97    Ca    9.2      22 May 2019 06:00  Phos  3.6     05-22  Mg     1.8     05-22            RADIOLOGY & ADDITIONAL STUDIES:

## 2023-08-02 NOTE — BRIEF OPERATIVE NOTE - VENOUS THROMBOEMBOLISM PROPHYLAXIS THERAPY
Patient Name: Wyatt Curry  : 1930    MRN: [id number]                              Today's Date: 2023       Admit Date: 2023    Visit Dx:     ICD-10-CM ICD-9-CM   1. Atrial fibrillation with RVR  I48.91 427.31   2. Hypomagnesemia  E83.42 275.2   3. Colitis  K52.9 558.9   4. Decreased independence with activities of daily living [Z78.9 (ICD-10-CM)]  Z78.9 V49.89   5. Impaired mobility [Z74.09 (ICD-10-CM)]  Z74.09 799.89     Patient Active Problem List   Diagnosis    Benign localized prostatic hyperplasia without lower urinary tract symptoms (LUTS)    History of bladder cancer    Left rotator cuff tear arthropathy    Coronary artery disease involving native coronary artery of native heart without angina pectoris    Left bundle branch block    Essential hypertension    Mixed hyperlipidemia    Overweight (BMI 25.0-29.9)    Non-rheumatic mitral regurgitation    Other fatigue    History of coronary artery stent placement    Lung nodule    Mediastinal adenopathy    Cough    Abnormal positron emission tomography (PET) scan    Basal cell carcinoma    Neoplasm of uncertain behavior    Elevated troponin    Bleeding    Ischemic cardiomyopathy    Pneumonia of right lower lobe due to infectious organism    PAF (paroxysmal atrial fibrillation)    Proctocolitis (high concern for C. difficile colitis)    Chronic systolic congestive heart failure    Clostridium difficile colitis    Atrial fibrillation with RVR    Recurrent Clostridioides difficile diarrhea    Symptomatic hypotension    Hypomagnesemia     Past Medical History:   Diagnosis Date    Arthritis     Bladder cancer     Bronchitis     CAD (coronary artery disease)     Cancer     bladder cancer    Carcinoma in situ of lip     basel cell    GERD (gastroesophageal reflux disease)     Hyperlipidemia     Hypertension     Irregular heart beat     Lung nodules      Past Surgical History:   Procedure Laterality Date    BLADDER SURGERY      CARDIAC  CATHETERIZATION      CARDIAC CATHETERIZATION Left 4/8/2023    Procedure: Cardiac Catheterization/Vascular Study;  Surgeon: Sukhi Hartley MD;  Location:  [facility];  Service: Cardiology;  Laterality: Left;    COLONOSCOPY      CORONARY ANGIOPLASTY WITH STENT PLACEMENT  2006    STENT X 2    CYSTOSCOPY      ENDOSCOPY      EXCISION LESION N/A 4/7/2023    Procedure: EXCISION LESION OF RIGHT TEMPLE AND LEFT TEMPLE WITH FROZEN SECTION WITH POSSIBLE FLAP OR GRAFT;  Surgeon: Brijesh Nickerson MD;  Location:  [facility] OR;  Service: ENT;  Laterality: N/A;    FLAP HEAD/NECK Left 03/09/2020    Procedure: POSSIBLE FLAP;  Surgeon: Brijesh Nickerson MD;  Location:  [facility] OR;  Service: ENT;  Laterality: Left;    HEAD/NECK LESION/CYST EXCISION Left 03/09/2020    Procedure: Excision of basal cell carcinoma of the left nasolabial fold\upper lip with frozen section and possible flap or graft;  Surgeon: Brijesh Nickerson MD;  Location:  [facility] OR;  Service: ENT;  Laterality: Left;    HERNIA REPAIR      SHOULDER SURGERY      SKIN BIOPSY      lip biopsy    SKIN FULL THICKNESS GRAFT Left 03/09/2020    Procedure: OR GRAFT;  Surgeon: Brijesh Nickerson MD;  Location:  [facility] OR;  Service: ENT;  Laterality: Left;    TRANSURETHRAL RESECTION OF BLADDER TUMOR        General Information       Row Name 08/02/23 1330          OT Time and Intention    Document Type therapy note (daily note)  -     Mode of Treatment occupational therapy  -       Row Name 08/02/23 1330          General Information    Patient Profile Reviewed yes  -     Existing Precautions/Restrictions fall;oxygen therapy device and L/min  -     Barriers to Rehab medically complex  -       Row Name 08/02/23 1330          Cognition    Orientation Status (Cognition) oriented x 4  -       Row Name 08/02/23 1330          Safety Issues, Functional Mobility    Impairments Affecting Function (Mobility) balance;endurance/activity tolerance  -                User Key  (r) = Recorded By, (t) = Taken By, (c) = Cosigned By      Initials Name Provider Type     Magy Soto, OTR/L Occupational Therapist                     Mobility/ADL's       Row Name 08/02/23 1330          Bed Mobility    Bed Mobility supine-sit  -     Sit-Supine [place] (Bed Mobility) supervision  -     Assistive Device (Bed Mobility) head of bed elevated  -       Row Name 08/02/23 1330          Transfers    Transfers sit-stand transfer;stand-sit transfer;bed-chair transfer  -       Row Name 08/02/23 1330          Bed-Chair Transfer    Bed-Chair [place] (Transfers) supervision  -       Row Name 08/02/23 1330          Sit-Stand Transfer    Sit-Stand [place] (Transfers) supervision  -       Row Name 08/02/23 1330          Stand-Sit Transfer    Stand-Sit [place] (Transfers) supervision  -[facility] Name 08/02/23 1330          Functional Mobility    Functional Mobility- Ind. Level standby assist  -       Row Name 08/02/23 1330          Lower Body Dressing Assessment/Training    [place] Level (Lower Body Dressing) modified independence;don;socks  -     Position (Lower Body Dressing) edge of bed sitting  -               User Key  (r) = Recorded By, (t) = Taken By, (c) = Cosigned By      Initials Name Provider Type     Magy Soto, OTR/L Occupational Therapist                   Obj/Interventions       Row Name 08/02/23 1330          Balance    Balance Interventions sitting;standing;sit to stand;supported;static;dynamic;occupation based/functional task  -               User Key  (r) = Recorded By, (t) = Taken By, (c) = Cosigned By      Initials Name Provider Type    Magy Miller, OTR/L Occupational Therapist                   Goals/Plan    No documentation.                  Clinical Impression       Row Name 08/02/23 1330          Plan of Care Review    Plan of Care Reviewed With patient  -     Progress no change  -     Outcome Evaluation Pt. able  to come to EOB at [facility] with good balance.  Mr. Curry appears to be in no pain, no SOB, but does report some imbalance.  He ambualted without an AD at [facility] & was go to his chair and sit in recliner with no difficulty. Mr. Curry reports living alone and caring for himself.  Cont OT tx to improve his act michelle, balance, and abiliyt to perofrm higher level ADLs/IADLs.  Cont OT tx  -CH       Row Name 08/02/23 1330          Therapy Assessment/Plan (OT)    Rehab Potential (OT) good, to achieve stated therapy goals  -     Criteria for Skilled Therapeutic Interventions Met (OT) yes;meets criteria;skilled treatment is necessary  -     Therapy Frequency (OT) 5 times/wk  -       Row Name 08/02/23 1330          Therapy Plan Review/Discharge Plan (OT)    Anticipated Discharge Disposition (OT) assisted living  -       Row Name 08/02/23 1330          Positioning and Restraints    Pre-Treatment Position in bed  -     Post Treatment Position chair  -     In Chair sitting;call light within reach;encouraged to call for assist  -               User Key  (r) = Recorded By, (t) = Taken By, (c) = Cosigned By      Initials Name Provider Type     Magy Soto, OTR/L Occupational Therapist                   Outcome Measures       Row Name 08/02/23 1330          How much help from another is currently needed...    Putting on and taking off regular lower body clothing? 3  -CH     Bathing (including washing, rinsing, and drying) 3  -CH     Toileting (which includes using toilet bed pan or urinal) 3  -CH     Putting on and taking off regular upper body clothing 4  -CH     Taking care of personal grooming (such as brushing teeth) 4  -CH     Eating meals 4  -CH     AM-PAC 6 Clicks Score (OT) 21  -CH       Row Name 08/02/23 1330          Functional Assessment    Outcome Measure Options AM-PAC 6 Clicks Daily Activity (OT)  -               User Key  (r) = Recorded By, (t) = Taken By, (c) = Cosigned By      Initials Name Provider Type      Magy Soto, OTR/L Occupational Therapist                    Occupational Therapy Education       Title: PT OT SLP Therapies (In Progress)       Topic: Occupational Therapy (In Progress)       Point: ADL training (Done)       Description:   Instruct learner(s) on proper safety adaptation and remediation techniques during self care or transfers.   Instruct in proper use of assistive devices.                  Learning Progress Summary             Patient Acceptance, E,D, VU,NR by  at 8/2/2023 1448    Acceptance, E, VU by  at 8/1/2023 0820    Comment: Educated on POC, benefits and purpose of OT services.                         Point: Home exercise program (Not Started)       Description:   Instruct learner(s) on appropriate technique for monitoring, assisting and/or progressing therapeutic exercises/activities.                  Learner Progress:  Not documented in this visit.              Point: Precautions (Not Started)       Description:   Instruct learner(s) on prescribed precautions during self-care and functional transfers.                  Learner Progress:  Not documented in this visit.              Point: Body mechanics (Not Started)       Description:   Instruct learner(s) on proper positioning and spine alignment during self-care, functional mobility activities and/or exercises.                  Learner Progress:  Not documented in this visit.                              User Key       Initials Effective Dates Name Provider Type Discipline     07/11/23 -  Magy Soto, OTR/L Occupational Therapist OT     05/03/23 -  Adelaida Rodas, OT Student OT Student OT                  OT Recommendation and Plan  Therapy Frequency (OT): 5 times/wk  Plan of Care Review  Plan of Care Reviewed With: patient  Progress: no change  Outcome Evaluation: Pt. able to come to EOB at [facility] with good balance.  Mr. Curry appears to be in no pain, no SOB, but does report some imbalance.  He ambualted without an AD at  [facility] & was go to his chair and sit in recliner with no difficulty. Mr. Curry reports living alone and caring for himself.  Cont OT tx to improve his act michelle, balance, and abiliyt to perofrm higher level ADLs/IADLs.  Cont OT tx     Time Calculation:         Time Calculation- OT       Row Name 08/02/23 1330             Time Calculation- OT    OT Start Time 1330  -CH      OT Stop Time 1358  -CH      OT Time Calculation (min) 28 min  -CH      Total Timed Code Minutes- OT 28 minute(s)  -CH      OT Received On 08/02/23  -CH         Timed Charges    78617 - OT Self Care/Mgmt Minutes 28  -CH         Total Minutes    Timed Charges Total Minutes 28  -CH       Total Minutes 28  -CH                User Key  (r) = Recorded By, (t) = Taken By, (c) = Cosigned By      Initials Name Provider Type    CH Magy Soto OTR/L Occupational Therapist                  Therapy Charges for Today       Code Description Service Date Service Provider Modifiers Qty    [id number] HC OT SELF CARE/MGMT/TRAIN EA 15 MIN 8/2/2023 Magy Soto OTR/L GO 2                 MELIA Rodriguez/MARIELA  8/2/2023   SCD

## 2024-02-15 NOTE — ASU PATIENT PROFILE, ADULT - AS [facility] M[facility]
Goal Outcome Evaluation:  Plan of Care Reviewed With: patient        Progress: no change  Outcome Evaluation: peg tube with feeding at 45/h cont. left neph tube with small amount cloudy drainage. [place] to bsd cont. more alert this am and early afternoon, appears comfortable. trach collar with humidification cont. turning and weight shifting cont. no acute distress noted. cont to monitor.                                (4) no limitation